# Patient Record
Sex: FEMALE | Race: WHITE | NOT HISPANIC OR LATINO | Employment: UNEMPLOYED | URBAN - METROPOLITAN AREA
[De-identification: names, ages, dates, MRNs, and addresses within clinical notes are randomized per-mention and may not be internally consistent; named-entity substitution may affect disease eponyms.]

---

## 2017-03-24 ENCOUNTER — HOSPITAL ENCOUNTER (OUTPATIENT)
Dept: LAB | Facility: CLINIC | Age: 37
Discharge: HOME | End: 2017-03-24
Attending: OBSTETRICS & GYNECOLOGY | Admitting: OBSTETRICS & GYNECOLOGY
Payer: COMMERCIAL

## 2017-03-24 DIAGNOSIS — E28.9 OVARIAN DYSFUNCTION, UNSPECIFIED: ICD-10-CM

## 2017-03-24 DIAGNOSIS — E03.9 HYPOTHYROIDISM, UNSPECIFIED: Primary | ICD-10-CM

## 2017-03-24 LAB — TSH SERPL DL<=0.05 MIU/L-ACNC: 1.7 MU/L (ref 0.4–4)

## 2017-03-24 PROCEDURE — 36415 COLL VENOUS BLD VENIPUNCTURE: CPT | Performed by: OBSTETRICS & GYNECOLOGY

## 2017-03-24 PROCEDURE — 83520 IMMUNOASSAY QUANT NOS NONAB: CPT | Performed by: OBSTETRICS & GYNECOLOGY

## 2017-03-24 PROCEDURE — 84443 ASSAY THYROID STIM HORMONE: CPT | Performed by: OBSTETRICS & GYNECOLOGY

## 2017-03-26 LAB — MIS SERPL-MCNC: 0.37 NG/ML

## 2017-04-05 ENCOUNTER — TELEPHONE (OUTPATIENT)
Dept: FAMILY MEDICINE | Facility: CLINIC | Age: 37
End: 2017-04-05

## 2017-04-05 NOTE — TELEPHONE ENCOUNTER
4/5/2017    Call Regarding Preventive Health Screening Cervical/PAP    Attempt 1    Message on voicemail     Comments:       Outreach   KV

## 2017-05-26 NOTE — TELEPHONE ENCOUNTER
5/26/2017    Call Regarding Preventive Health Screening Cervical/PAP    Attempt 2    Message on voicemail     Comments:         Outreach   DAVID

## 2017-06-15 NOTE — TELEPHONE ENCOUNTER
6/15/2017    Call Regarding Preventive Health Screening Cervical/PAP    Attempt 3    Message on voicemail     Comments:         Outreach   DAVID

## 2017-06-15 NOTE — TELEPHONE ENCOUNTER
Called patient by error, please have patient disregard today's voicemail as the annual exam is already scheduled.

## 2017-06-28 ENCOUNTER — OFFICE VISIT (OUTPATIENT)
Dept: FAMILY MEDICINE | Facility: CLINIC | Age: 37
End: 2017-06-28
Payer: COMMERCIAL

## 2017-06-28 VITALS
WEIGHT: 149 LBS | BODY MASS INDEX: 26.4 KG/M2 | RESPIRATION RATE: 16 BRPM | HEART RATE: 60 BPM | SYSTOLIC BLOOD PRESSURE: 110 MMHG | DIASTOLIC BLOOD PRESSURE: 76 MMHG | OXYGEN SATURATION: 99 % | HEIGHT: 63 IN | TEMPERATURE: 98.1 F

## 2017-06-28 DIAGNOSIS — R35.0 URINARY FREQUENCY: ICD-10-CM

## 2017-06-28 DIAGNOSIS — Z12.4 SCREENING FOR CERVICAL CANCER: ICD-10-CM

## 2017-06-28 DIAGNOSIS — Z00.00 ROUTINE GENERAL MEDICAL EXAMINATION AT A HEALTH CARE FACILITY: Primary | ICD-10-CM

## 2017-06-28 LAB
ALBUMIN UR-MCNC: NEGATIVE MG/DL
APPEARANCE UR: CLEAR
BILIRUB UR QL STRIP: NEGATIVE
COLOR UR AUTO: YELLOW
GLUCOSE UR STRIP-MCNC: NEGATIVE MG/DL
HGB UR QL STRIP: NEGATIVE
KETONES UR STRIP-MCNC: ABNORMAL MG/DL
LEUKOCYTE ESTERASE UR QL STRIP: NEGATIVE
NITRATE UR QL: NEGATIVE
PH UR STRIP: 6 PH (ref 5–7)
SP GR UR STRIP: 1.01 (ref 1–1.03)
URN SPEC COLLECT METH UR: ABNORMAL
UROBILINOGEN UR STRIP-ACNC: 0.2 EU/DL (ref 0.2–1)

## 2017-06-28 PROCEDURE — 36415 COLL VENOUS BLD VENIPUNCTURE: CPT | Performed by: PHYSICIAN ASSISTANT

## 2017-06-28 PROCEDURE — 81003 URINALYSIS AUTO W/O SCOPE: CPT | Performed by: PHYSICIAN ASSISTANT

## 2017-06-28 PROCEDURE — 99395 PREV VISIT EST AGE 18-39: CPT | Performed by: PHYSICIAN ASSISTANT

## 2017-06-28 PROCEDURE — 80061 LIPID PANEL: CPT | Performed by: PHYSICIAN ASSISTANT

## 2017-06-28 PROCEDURE — G0145 SCR C/V CYTO,THINLAYER,RESCR: HCPCS | Performed by: PHYSICIAN ASSISTANT

## 2017-06-28 PROCEDURE — G0476 HPV COMBO ASSAY CA SCREEN: HCPCS | Performed by: PHYSICIAN ASSISTANT

## 2017-06-28 PROCEDURE — 82947 ASSAY GLUCOSE BLOOD QUANT: CPT | Performed by: PHYSICIAN ASSISTANT

## 2017-06-28 NOTE — NURSING NOTE
"Chief Complaint   Patient presents with     Physical       Initial /76  Pulse 60  Temp 98.1  F (36.7  C)  Resp 16  Ht 5' 2.5\" (1.588 m)  Wt 149 lb (67.6 kg)  SpO2 99%  BMI 26.82 kg/m2 Estimated body mass index is 26.82 kg/(m^2) as calculated from the following:    Height as of this encounter: 5' 2.5\" (1.588 m).    Weight as of this encounter: 149 lb (67.6 kg).  Medication Reconciliation: mira Cuevas CMA      "

## 2017-06-28 NOTE — PROGRESS NOTES
SUBJECTIVE:   CC: Rene Gilbert is an 36 year old woman who presents for preventive health visit.     Healthy Habits:    Do you get at least three servings of calcium containing foods daily (dairy, green leafy vegetables, etc.)? Maybe 2    Amount of exercise or daily activities, outside of work: 2 day(s) per week    Problems taking medications regularly No    Medication side effects: No    Have you had an eye exam in the past two years? yes    Do you see a dentist twice per year? yes    Do you have sleep apnea, excessive snoring or daytime drowsiness?no     No sign of UA, uses pessary.        Today's PHQ-2 Score:   PHQ-2 ( 1999 Pfizer) 6/28/2017 5/29/2015   Q1: Little interest or pleasure in doing things 0 0   Q2: Feeling down, depressed or hopeless 0 0   PHQ-2 Score 0 0       Abuse: Current or Past(Physical, Sexual or Emotional)- No  Do you feel safe in your environment - Yes    Social History   Substance Use Topics     Smoking status: Never Smoker     Smokeless tobacco: Never Used     Alcohol use No     The patient does not drink >3 drinks per day nor >7 drinks per week.    Reviewed orders with patient.  Reviewed health maintenance and updated orders accordingly - Yes  BP Readings from Last 3 Encounters:   06/28/17 110/76   05/02/16 116/75   03/25/16 124/73    Wt Readings from Last 3 Encounters:   06/28/17 149 lb (67.6 kg)   05/02/16 158 lb 11.2 oz (72 kg)   03/19/16 176 lb (79.8 kg)                  Patient Active Problem List   Diagnosis     Female infertility of other specified origin     Pelvic adhesive disease     Endometriosis     Pelvic pain in female     Dyspareunia     Vitamin D deficiency     Anemia     Family history of sickle cell trait     Pelvic floor weakness in female     Vaginal and cervical prolapse     Past Surgical History:   Procedure Laterality Date     ENT SURGERY      tonsillectomy     LAPAROSCOPIC ABLATION ENDOMETRIOSIS  9/13/2013    Procedure: LAPAROSCOPIC ABLATION ENDOMETRIOSIS;;   Surgeon: Viki Mills MD;  Location: [facility]     LAPAROSCOPIC ABLATION ENDOMETRIOSIS  11/28/2014    Procedure: LAPAROSCOPIC ABLATION ENDOMETRIOSIS;  Surgeon: Viki Mills MD;  Location: [facility]     LAPAROSCOPIC CYSTECTOMY OVARIAN (BENIGN)  9/13/2013    Procedure: LAPAROSCOPIC CYSTECTOMY OVARIAN (BENIGN);;  Surgeon: Viki Mills MD;  Location: [facility]     LAPAROSCOPIC TUBAL DYE STUDY  11/28/2014    Procedure: LAPAROSCOPIC TUBAL DYE STUDY;  Surgeon: Viki Mills MD;  Location: [facility]     LAPAROSCOPY DIAGNOSTIC (GYN)  9/13/2013    Procedure: LAPAROSCOPY DIAGNOSTIC (GYN);  laparoscopy pelviscopy right laparoscopic right ovarian cyst , bilateral transection of uteral sacral ligament. cautery of endometriosis, placement of interceed;  Surgeon: Viki Mills MD;  Location: [facility]     LAPAROSCOPY DIAGNOSTIC (GYN) N/A 11/28/2014    Procedure: LAPAROSCOPY DIAGNOSTIC (GYN);  Surgeon: Viki Mills MD;  Location: [facility]       Social History   Substance Use Topics     Smoking status: Never Smoker     Smokeless tobacco: Never Used     Alcohol use No     Family History   Problem Relation Age of Onset     Hypertension Mother      Chronic Obstructive Pulmonary Disease Father      Breast Cancer Other      First cousin     DIABETES Maternal Aunt      Other - See Comments Other      Down Syndrome, Paternal first cousin           Mammogram not appropriate for this patient based on age.    Pertinent mammograms are reviewed under the imaging tab.  History of abnormal Pap smear:   NO - age 30-65 PAP every 5 years with negative HPV co-testing recommended  Last 3 Pap Results:   PAP (no units)   Date Value   10/01/2013 NIL       Reviewed and updated as needed this visit by clinical staff  Tobacco  Allergies  Meds  Problems  Med Hx  Surg Hx  Fam Hx  Soc Hx          Reviewed and updated as needed this visit by Provider  Tobacco  Allergies  Meds  Problems   "Med Hx  Surg Hx  Fam Hx  Soc Hx           ROS:  C: NEGATIVE for fever, chills, change in weight  I: NEGATIVE for worrisome rashes, moles or lesions  E: NEGATIVE for vision changes or irritation  ENT: NEGATIVE for ear, mouth and throat problems  R: NEGATIVE for significant cough or SOB  B: NEGATIVE for masses, tenderness or discharge  CV: NEGATIVE for chest pain, palpitations or peripheral edema  GI: NEGATIVE for nausea, abdominal pain, heartburn, or change in bowel habits   female: normal menses and frequency  M: NEGATIVE for significant arthralgias or myalgia  N: NEGATIVE for weakness, dizziness or paresthesias  P: NEGATIVE for changes in mood or affect    OBJECTIVE:   /76  Pulse 60  Temp 98.1  F (36.7  C)  Resp 16  Ht 5' 2.5\" (1.588 m)  Wt 149 lb (67.6 kg)  SpO2 99%  BMI 26.82 kg/m2  EXAM:  GENERAL: healthy, alert and no distress  EYES: Eyes grossly normal to inspection, PERRL and conjunctivae and sclerae normal  HENT: ear canals and TM's normal, nose and mouth without ulcers or lesions  NECK: no adenopathy, no asymmetry, masses, or scars and thyroid normal to palpation  RESP: lungs clear to auscultation - no rales, rhonchi or wheezes  BREAST: normal without masses, tenderness or nipple discharge and no palpable axillary masses or adenopathy  CV: regular rate and rhythm, normal S1 S2, no S3 or S4, no murmur, click or rub, no peripheral edema and peripheral pulses strong  ABDOMEN: soft, nontender, no hepatosplenomegaly, no masses and bowel sounds normal   (female): normal female external genitalia, normal urethral meatus, vaginal mucosa pink, moist, well rugated, and normal cervix/adnexa/uterus without masses or discharge  MS: no gross musculoskeletal defects noted, no edema  SKIN: no suspicious lesions or rashes  NEURO: Normal strength and tone, mentation intact and speech normal  PSYCH: mentation appears normal, affect normal/bright  Results for orders placed or performed in visit on 06/28/17 " "  *UA reflex to Microscopic and Culture ([place] and [facility] (except [place] and [place])   Result Value Ref Range    Color Urine Yellow     Appearance Urine Clear     Glucose Urine Negative NEG mg/dL    Bilirubin Urine Negative NEG    Ketones Urine Trace (A) NEG mg/dL    Specific Gravity Urine 1.015 1.003 - 1.035    Blood Urine Negative NEG    pH Urine 6.0 5.0 - 7.0 pH    Protein Albumin Urine Negative NEG mg/dL    Urobilinogen Urine 0.2 0.2 - 1.0 EU/dL    Nitrite Urine Negative NEG    Leukocyte Esterase Urine Negative NEG    Source Midstream Urine      ASSESSMENT/PLAN:       ICD-10-CM    1. Routine general medical examination at a health care facility Z00.00 LIPID REFLEX TO DIRECT LDL PANEL     Glucose   2. Screening for cervical cancer Z12.4 Pap imaged thin layer screen with HPV - recommended age 30 - 65     HPV High Risk Types DNA Cervical   3. Urinary frequency R35.0 *UA reflex to Microscopic and Culture ([place] and [facility] (except [place] and [place])       COUNSELING:   Reviewed preventive health counseling, as reflected in patient instructions       reports that she has never smoked. She has never used smokeless tobacco.    Estimated body mass index is 26.82 kg/(m^2) as calculated from the following:    Height as of this encounter: 5' 2.5\" (1.588 m).    Weight as of this encounter: 149 lb (67.6 kg).   Weight management plan: : -30 minutes of exercise 5 days a week (walking, jogging, housework, biking).  -Eat at least 5 servings of fruits and vegetables daily.   -Eat whole-grain bread, whole-wheat pasta and brown rice instead of white grains and rice.      Counseling Resources:  ATP IV Guidelines  Pooled Cohorts Equation Calculator  Breast Cancer Risk Calculator  FRAX Risk Assessment  ICSI Preventive Guidelines  Dietary Guidelines for Americans, 2010  USDA's MyPlate  ASA Prophylaxis  Lung CA Screening    Kenisha Taylor PA-C  [facility]  "

## 2017-06-28 NOTE — PATIENT INSTRUCTIONS

## 2017-06-28 NOTE — MR AVS SNAPSHOT
After Visit Summary   6/28/2017    Rene Gilbert    MRN: [id number]           Patient Information     Date Of Birth          1980        Visit Information        Provider Department      6/28/2017 2:30 PM Kenisha Taylor PA-C [facility]        Today's Diagnoses     Routine general medical examination at a health care facility    -  1    Screening for cervical cancer        Urinary frequency          Care Instructions      Preventive Health Recommendations  Female Ages 26 - 39  Yearly exam:   See your health care provider every year in order to    Review health changes.     Discuss preventive care.      Review your medicines if you your doctor has prescribed any.    Until age 30: Get a Pap test every three years (more often if you have had an abnormal result).    After age 30: Talk to your doctor about whether you should have a Pap test every 3 years or have a Pap test with HPV screening every 5 years.   You do not need a Pap test if your uterus was removed (hysterectomy) and you have not had cancer.  You should be tested each year for STDs (sexually transmitted diseases), if you're at risk.   Talk to your provider about how often to have your cholesterol checked.  If you are at risk for diabetes, you should have a diabetes test (fasting glucose).  Shots: Get a flu shot each year. Get a tetanus shot every 10 years.   Nutrition:     Eat at least 5 servings of fruits and vegetables each day.    Eat whole-grain bread, whole-wheat pasta and brown rice instead of white grains and rice.    Talk to your provider about Calcium and Vitamin D.     Lifestyle    Exercise at least 150 minutes a week (30 minutes a day, 5 days of the week). This will help you control your weight and prevent disease.    Limit alcohol to one drink per day.    No smoking.     Wear sunscreen to prevent skin cancer.    See your dentist every six months for an exam and  cleaning.    Preventive Health Recommendations  Female Ages 26 - 39  Yearly exam:   See your health care provider every year in order to    Review health changes.     Discuss preventive care.      Review your medicines if you your doctor has prescribed any.    Until age 30: Get a Pap test every three years (more often if you have had an abnormal result).    After age 30: Talk to your doctor about whether you should have a Pap test every 3 years or have a Pap test with HPV screening every 5 years.   You do not need a Pap test if your uterus was removed (hysterectomy) and you have not had cancer.  You should be tested each year for STDs (sexually transmitted diseases), if you're at risk.   Talk to your provider about how often to have your cholesterol checked.  If you are at risk for diabetes, you should have a diabetes test (fasting glucose).  Shots: Get a flu shot each year. Get a tetanus shot every 10 years.   Nutrition:     Eat at least 5 servings of fruits and vegetables each day.    Eat whole-grain bread, whole-wheat pasta and brown rice instead of white grains and rice.    Talk to your provider about Calcium and Vitamin D.     Lifestyle    Exercise at least 150 minutes a week (30 minutes a day, 5 days of the week). This will help you control your weight and prevent disease.    Limit alcohol to one drink per day.    No smoking.     Wear sunscreen to prevent skin cancer.    See your dentist every six months for an exam and cleaning.            Follow-ups after your visit        Who to contact     If you have questions or need follow up information about today's clinic visit or your schedule please contact [facility] directly at [phone].  Normal or non-critical lab and imaging results will be communicated to you by MyChart, letter or phone within 4 business days after the clinic has received the results. If you do not hear from us within 7 days, please contact the clinic through  "MyChart or phone. If you have a critical or abnormal lab result, we will notify you by phone as soon as possible.  Submit refill requests through [employer] or call your pharmacy and they will forward the refill request to us. Please allow 3 business days for your refill to be completed.          Additional Information About Your Visit        mymxloghart Information     [employer] gives you secure access to your electronic health record. If you see a primary care provider, you can also send messages to your care team and make appointments. If you have questions, please call your primary care clinic.  If you do not have a primary care provider, please call [phone] and they will assist you.        Care EveryWhere ID     This is your Care EveryWhere ID. This could be used by other organizations to access your [facility] medical records  [id number]        Your Vitals Were     Pulse Temperature Respirations Height Pulse Oximetry BMI (Body Mass Index)    60 98.1  F (36.7  C) 16 5' 2.5\" (1.588 m) 99% 26.82 kg/m2       Blood Pressure from Last 3 Encounters:   06/28/17 110/76   05/02/16 116/75   03/25/16 124/73    Weight from Last 3 Encounters:   06/28/17 149 lb (67.6 kg)   05/02/16 158 lb 11.2 oz (72 kg)   03/19/16 176 lb (79.8 kg)              We Performed the Following     *UA reflex to Microscopic and Culture ([place] and [facility] (except [place] and [place])     HPV High Risk Types DNA Cervical     Pap imaged thin layer screen with HPV - recommended age 30 - 65        Primary Care Provider Office Phone # Fax #    Malou Rosales [phone]       [facility] [address] [address]        Equal Access to Services     CHAR MARTINEZ : Hadii bisi Diehl, fareed wakefield, maurilio baptistealmaanatoly hairston. So [facility] [phone].    ATENCIÓN: Si habla español, tiene a lynch disposición servicios gratuitos de asistencia lingüística. " Joseph roblero [phone].    We comply with applicable federal civil rights laws and [place] laws. We do not discriminate on the basis of race, color, national origin, age, disability sex, sexual orientation or gender identity.            Thank you!     Thank you for choosing [facility]  for your care. Our goal is always to provide you with excellent care. Hearing back from our patients is one way we can continue to improve our services. Please take a few minutes to complete the written survey that you may receive in the mail after your visit with us. Thank you!             Your Updated Medication List - Protect others around you: Learn how to safely use, store and throw away your medicines at www.disposemymeds.org.          This list is accurate as of: 6/28/17  3:01 PM.  Always use your most recent med list.                   Brand Name Dispense Instructions for use Diagnosis    CENTRUM SILVER per tablet      Take 1 tablet by mouth daily        COENZYME Q-10 PO      Take 30 mg by mouth daily        dhea 25 MG Tabs      Take 25 mg by mouth daily        prenatal multivitamin  plus iron 27-0.8 MG Tabs per tablet      Take 1 tablet by mouth daily        vitamin B complex with vitamin C Tabs tablet      Take 1 tablet by mouth daily        VITAMIN D (CHOLECALCIFEROL) PO      Take by mouth daily

## 2017-06-28 NOTE — PROGRESS NOTES
"   SUBJECTIVE:   CC: Rene Gilbert is an 36 year old woman who presents for preventive health visit.     Healthy Habits:    Do you get at least three servings of calcium containing foods daily (dairy, green leafy vegetables, etc.)? Maybe 2    Amount of exercise or daily activities, outside of work: 2 day(s) per week    Problems taking medications regularly No    Medication side effects: No    Have you had an eye exam in the past two years? yes    Do you see a dentist twice per year? yes    Do you have sleep apnea, excessive snoring or daytime drowsiness?no        {additional problems to add (Optional):632479}    Today's PHQ-2 Score:   PHQ-2 ( 1999 Pfizer) 6/28/2017 5/29/2015   Q1: Little interest or pleasure in doing things 0 0   Q2: Feeling down, depressed or hopeless 0 0   PHQ-2 Score 0 0       Abuse: Current or Past(Physical, Sexual or Emotional)- No  Do you feel safe in your environment - Yes    Social History   Substance Use Topics     Smoking status: Never Smoker     Smokeless tobacco: Never Used     Alcohol use No     The patient does not drink >3 drinks per day nor >7 drinks per week.    Reviewed orders with patient.  Reviewed health maintenance and updated orders accordingly - Yes  {Chronicprobdata (Optional):794288}    {Mammo Decision Support (Optional):955941}    Pertinent mammograms are reviewed under the imaging tab.  History of abnormal Pap smear: {PAP HX:343032}    Reviewed and updated as needed this visit by clinical staff  Tobacco  Allergies  Problems  Med Hx  Surg Hx  Fam Hx  Soc Hx        Reviewed and updated as needed this visit by Provider  Problems        {HISTORY OPTIONS (Optional):433559}    ROS:  {FEMALE PREVENTATIVE ROS:862290}    OBJECTIVE:   /76  Pulse 60  Temp 98.1  F (36.7  C)  Resp 16  Ht 5' 2.5\" (1.588 m)  Wt 149 lb (67.6 kg)  SpO2 99%  BMI 26.82 kg/m2  EXAM:  {Exam Choices:566410}    ASSESSMENT/PLAN:   {Diag Picklist:048897}    COUNSELING:   {FEMALE COUNSELING " "MESSAGES:606772::\"Reviewed preventive health counseling, as reflected in patient instructions\"}    {BP Counseling- Complete if BP >= 120/80  (Optional):179723}     reports that she has never smoked. She has never used smokeless tobacco.  {Tobacco Cessation -- Complete if patient is a smoker (Optional):369399}  Estimated body mass index is 26.82 kg/(m^2) as calculated from the following:    Height as of this encounter: 5' 2.5\" (1.588 m).    Weight as of this encounter: 149 lb (67.6 kg).   {Weight Management Plan (ACO) Complete if BMI is abnormal-  Ages 18-64  BMI >24.9.  Age 65+ with BMI <23 or >30 (Optional):001490}    Counseling Resources:  ATP IV Guidelines  Pooled Cohorts Equation Calculator  Breast Cancer Risk Calculator  FRAX Risk Assessment  ICSI Preventive Guidelines  Dietary Guidelines for Americans, 2010  USDA's MyPlate  ASA Prophylaxis  Lung CA Screening    Kenisha Taylor PA-C  [facility]  "

## 2017-06-28 NOTE — LETTER
[facility]  [address]  [address]  [place] [id number] [phone]                                                                                                           Rene Gilbert  [address]  [place] [address]    June 29, 2017      Dear Rene,    The results of your recent tests were reviewed and are enclosed.     - Your total cholesterol is a little high (>200) but okay for not fasting.  We can recheck next year.  - Your glucose (screening for diabetes) was normal.  -Urine is normal.  - You will receive your Pap Smear results in a separate letter.    Results for orders placed or performed in visit on 06/28/17   LIPID REFLEX TO DIRECT LDL PANEL   Result Value Ref Range    Cholesterol 228 (H) <200 mg/dL    Triglycerides 69 <150 mg/dL    HDL Cholesterol 57 >49 mg/dL    LDL Cholesterol Calculated 157 (H) <100 mg/dL    Non HDL Cholesterol 171 (H) <130 mg/dL   Glucose   Result Value Ref Range    Glucose 83 70 - 99 mg/dL   *UA reflex to Microscopic and Culture ([place] and [facility] [place] [facility] (except [place] and [place])   Result Value Ref Range    Color Urine Yellow     Appearance Urine Clear     Glucose Urine Negative NEG mg/dL    Bilirubin Urine Negative NEG    Ketones Urine Trace (A) NEG mg/dL    Specific Gravity Urine 1.015 1.003 - 1.035    Blood Urine Negative NEG    pH Urine 6.0 5.0 - 7.0 pH    Protein Albumin Urine Negative NEG mg/dL    Urobilinogen Urine 0.2 0.2 - 1.0 EU/dL    Nitrite Urine Negative NEG    Leukocyte Esterase Urine Negative NEG    Source Midstream Urine      Thank you for choosing [facility].  We appreciate the opportunity to serve you and look forward to supporting your healthcare needs in the future.    If you have any questions or concerns, please call me or my staff at [phone].      Sincerely,      Kenisha Taylor PA-C

## 2017-06-29 PROBLEM — E78.2 MIXED HYPERLIPIDEMIA: Status: ACTIVE | Noted: 2017-06-29

## 2017-06-29 LAB
CHOLEST SERPL-MCNC: 228 MG/DL
GLUCOSE SERPL-MCNC: 83 MG/DL (ref 70–99)
HDLC SERPL-MCNC: 57 MG/DL
LDLC SERPL CALC-MCNC: 157 MG/DL
NONHDLC SERPL-MCNC: 171 MG/DL
TRIGL SERPL-MCNC: 69 MG/DL

## 2017-07-05 LAB
COPATH REPORT: NORMAL
PAP: NORMAL

## 2017-07-06 LAB
FINAL DIAGNOSIS: NORMAL
HPV HR 12 DNA CVX QL NAA+PROBE: NEGATIVE
HPV16 DNA SPEC QL NAA+PROBE: NEGATIVE
HPV18 DNA SPEC QL NAA+PROBE: NEGATIVE
SPECIMEN DESCRIPTION: NORMAL

## 2018-05-24 ENCOUNTER — MEDICAL CORRESPONDENCE (OUTPATIENT)
Dept: HEALTH INFORMATION MANAGEMENT | Facility: CLINIC | Age: 38
End: 2018-05-24

## 2018-06-27 ENCOUNTER — HOSPITAL ENCOUNTER (OUTPATIENT)
Dept: LAB | Facility: CLINIC | Age: 38
Discharge: HOME | End: 2018-06-27
Admitting: OBSTETRICS & GYNECOLOGY
Payer: COMMERCIAL

## 2018-06-27 DIAGNOSIS — E28.2 POLYCYSTIC OVARIES: ICD-10-CM

## 2018-06-27 DIAGNOSIS — Z13.1 SCREENING FOR DIABETES MELLITUS: ICD-10-CM

## 2018-06-27 DIAGNOSIS — N92.5 RETROGRADE MENSTRUATION: ICD-10-CM

## 2018-06-27 DIAGNOSIS — Z13.6 SCREENING FOR ISCHEMIC HEART DISEASE: ICD-10-CM

## 2018-06-27 DIAGNOSIS — E28.9 DISORDER OF ENDOCRINE OVARY: ICD-10-CM

## 2018-06-27 DIAGNOSIS — Z11.3 SCREENING EXAMINATION FOR VENEREAL DISEASE: ICD-10-CM

## 2018-06-27 DIAGNOSIS — E03.9 MYXEDEMA HEART DISEASE: ICD-10-CM

## 2018-06-27 DIAGNOSIS — I51.9 MYXEDEMA HEART DISEASE: ICD-10-CM

## 2018-06-27 LAB
ABO + RH BLD: NORMAL
ABO + RH BLD: NORMAL
ANION GAP SERPL CALCULATED.3IONS-SCNC: 6 MMOL/L (ref 3–14)
B-HCG SERPL-ACNC: <1 IU/L (ref 0–5)
BLD GP AB SCN SERPL QL: NORMAL
BLOOD BANK CMNT PATIENT-IMP: NORMAL
BUN SERPL-MCNC: 10 MG/DL (ref 7–30)
CALCIUM SERPL-MCNC: 8.5 MG/DL (ref 8.5–10.1)
CHLORIDE SERPL-SCNC: 106 MMOL/L (ref 94–109)
CO2 SERPL-SCNC: 27 MMOL/L (ref 20–32)
CREAT SERPL-MCNC: 0.6 MG/DL (ref 0.52–1.04)
ERYTHROCYTE [DISTWIDTH] IN BLOOD BY AUTOMATED COUNT: 12.3 % (ref 10–15)
ESTRADIOL SERPL-MCNC: 112 PG/ML
FSH SERPL-ACNC: 6 IU/L
GFR SERPL CREATININE-BSD FRML MDRD: >90 ML/MIN/1.7M2
GLUCOSE SERPL-MCNC: 88 MG/DL (ref 70–99)
HBA1C MFR BLD: 4.9 % (ref 0–5.6)
HCT VFR BLD AUTO: 38.2 % (ref 35–47)
HGB BLD-MCNC: 12.3 G/DL (ref 11.7–15.7)
LH SERPL-ACNC: 3.7 IU/L
MCH RBC QN AUTO: 30.4 PG (ref 26.5–33)
MCHC RBC AUTO-ENTMCNC: 32.2 G/DL (ref 31.5–36.5)
MCV RBC AUTO: 94 FL (ref 78–100)
PLATELET # BLD AUTO: 389 10E9/L (ref 150–450)
POTASSIUM SERPL-SCNC: 3.7 MMOL/L (ref 3.4–5.3)
PROGEST SERPL-MCNC: <0.2 NG/ML
PROLACTIN SERPL-MCNC: 8 UG/L (ref 3–27)
RBC # BLD AUTO: 4.05 10E12/L (ref 3.8–5.2)
SODIUM SERPL-SCNC: 139 MMOL/L (ref 133–144)
SPECIMEN EXP DATE BLD: NORMAL
TSH SERPL DL<=0.005 MIU/L-ACNC: 0.52 MU/L (ref 0.4–4)
WBC # BLD AUTO: 7.1 10E9/L (ref 4–11)

## 2018-06-27 PROCEDURE — 86780 TREPONEMA PALLIDUM: CPT | Performed by: OBSTETRICS & GYNECOLOGY

## 2018-06-27 PROCEDURE — 84144 ASSAY OF PROGESTERONE: CPT | Performed by: OBSTETRICS & GYNECOLOGY

## 2018-06-27 PROCEDURE — 84702 CHORIONIC GONADOTROPIN TEST: CPT | Performed by: OBSTETRICS & GYNECOLOGY

## 2018-06-27 PROCEDURE — 86901 BLOOD TYPING SEROLOGIC RH(D): CPT | Performed by: OBSTETRICS & GYNECOLOGY

## 2018-06-27 PROCEDURE — 87389 HIV-1 AG W/HIV-1&-2 AB AG IA: CPT | Performed by: OBSTETRICS & GYNECOLOGY

## 2018-06-27 PROCEDURE — 85027 COMPLETE CBC AUTOMATED: CPT | Performed by: OBSTETRICS & GYNECOLOGY

## 2018-06-27 PROCEDURE — 86762 RUBELLA ANTIBODY: CPT | Performed by: OBSTETRICS & GYNECOLOGY

## 2018-06-27 PROCEDURE — 86900 BLOOD TYPING SEROLOGIC ABO: CPT | Performed by: OBSTETRICS & GYNECOLOGY

## 2018-06-27 PROCEDURE — 82306 VITAMIN D 25 HYDROXY: CPT | Performed by: OBSTETRICS & GYNECOLOGY

## 2018-06-27 PROCEDURE — 82670 ASSAY OF TOTAL ESTRADIOL: CPT | Performed by: OBSTETRICS & GYNECOLOGY

## 2018-06-27 PROCEDURE — 83002 ASSAY OF GONADOTROPIN (LH): CPT | Performed by: OBSTETRICS & GYNECOLOGY

## 2018-06-27 PROCEDURE — 83001 ASSAY OF GONADOTROPIN (FSH): CPT | Performed by: OBSTETRICS & GYNECOLOGY

## 2018-06-27 PROCEDURE — 36415 COLL VENOUS BLD VENIPUNCTURE: CPT | Performed by: OBSTETRICS & GYNECOLOGY

## 2018-06-27 PROCEDURE — 87340 HEPATITIS B SURFACE AG IA: CPT | Performed by: OBSTETRICS & GYNECOLOGY

## 2018-06-27 PROCEDURE — 86850 RBC ANTIBODY SCREEN: CPT | Performed by: OBSTETRICS & GYNECOLOGY

## 2018-06-27 PROCEDURE — 84443 ASSAY THYROID STIM HORMONE: CPT | Performed by: OBSTETRICS & GYNECOLOGY

## 2018-06-27 PROCEDURE — 84146 ASSAY OF PROLACTIN: CPT | Performed by: OBSTETRICS & GYNECOLOGY

## 2018-06-27 PROCEDURE — 83036 HEMOGLOBIN GLYCOSYLATED A1C: CPT | Performed by: OBSTETRICS & GYNECOLOGY

## 2018-06-27 PROCEDURE — 80048 BASIC METABOLIC PNL TOTAL CA: CPT | Performed by: OBSTETRICS & GYNECOLOGY

## 2018-06-27 PROCEDURE — 83090 ASSAY OF HOMOCYSTEINE: CPT | Performed by: OBSTETRICS & GYNECOLOGY

## 2018-06-27 PROCEDURE — 86803 HEPATITIS C AB TEST: CPT | Performed by: OBSTETRICS & GYNECOLOGY

## 2018-06-28 LAB
DEPRECATED CALCIDIOL+CALCIFEROL SERPL-MC: 23 UG/L (ref 20–75)
HBV SURFACE AG SERPL QL IA: NONREACTIVE
HCV AB SERPL QL IA: NONREACTIVE
HIV 1+2 AB+HIV1 P24 AG SERPL QL IA: NONREACTIVE
RUBV IGG SERPL IA-ACNC: 90 IU/ML
T PALLIDUM AB SER QL: NONREACTIVE

## 2018-07-03 LAB — HCYS SERPL-SCNC: 6.3 UMOL/L (ref 4–12)

## 2018-09-12 ENCOUNTER — OFFICE VISIT (OUTPATIENT)
Dept: FAMILY MEDICINE | Facility: CLINIC | Age: 38
End: 2018-09-12
Payer: COMMERCIAL

## 2018-09-12 VITALS
HEIGHT: 63 IN | HEART RATE: 82 BPM | DIASTOLIC BLOOD PRESSURE: 72 MMHG | OXYGEN SATURATION: 96 % | RESPIRATION RATE: 16 BRPM | BODY MASS INDEX: 24.98 KG/M2 | WEIGHT: 141 LBS | TEMPERATURE: 97.9 F | SYSTOLIC BLOOD PRESSURE: 116 MMHG

## 2018-09-12 DIAGNOSIS — J32.9 SINOBRONCHITIS: Primary | ICD-10-CM

## 2018-09-12 DIAGNOSIS — Z23 NEED FOR VACCINATION: ICD-10-CM

## 2018-09-12 DIAGNOSIS — N92.6 MISSED PERIODS: ICD-10-CM

## 2018-09-12 DIAGNOSIS — J40 SINOBRONCHITIS: Primary | ICD-10-CM

## 2018-09-12 LAB — BETA HCG QUAL IFA URINE: NEGATIVE

## 2018-09-12 PROCEDURE — 90686 IIV4 VACC NO PRSV 0.5 ML IM: CPT | Performed by: FAMILY MEDICINE

## 2018-09-12 PROCEDURE — 99213 OFFICE O/P EST LOW 20 MIN: CPT | Mod: 25 | Performed by: FAMILY MEDICINE

## 2018-09-12 PROCEDURE — 90471 IMMUNIZATION ADMIN: CPT | Performed by: FAMILY MEDICINE

## 2018-09-12 PROCEDURE — 84703 CHORIONIC GONADOTROPIN ASSAY: CPT | Performed by: FAMILY MEDICINE

## 2018-09-12 RX ORDER — CODEINE PHOSPHATE AND GUAIFENESIN 10; 100 MG/5ML; MG/5ML
2 SOLUTION ORAL EVERY 6 HOURS PRN
Qty: 120 ML | Refills: 0 | Status: SHIPPED | OUTPATIENT
Start: 2018-09-12

## 2018-09-12 RX ORDER — AMOXICILLIN 875 MG
875 TABLET ORAL 2 TIMES DAILY
Qty: 20 TABLET | Refills: 0 | Status: SHIPPED | OUTPATIENT
Start: 2018-09-12 | End: 2022-11-02

## 2018-09-12 NOTE — PROGRESS NOTES
Jose G bejarano    Pleasure meeting you in clinic today    Urine pregnancy test is negative    Please keep us posted with questions or concerns .      Best Regards,    Ifrah Palma MD  [facility]  [phone]

## 2018-09-12 NOTE — PROGRESS NOTES
SUBJECTIVE:   Rene Gilbert is a 37 year old female who presents to clinic today for the following health issues:      Acute Illness   Acute illness concerns: cold  Onset: 2 weeks     Fever: YES    Chills/Sweats: YES    Headache (location?): YES    Sinus Pressure:no    Conjunctivitis:  no    Ear Pain: YES- both    Rhinorrhea: no    Congestion: YES    Sore Throat: YES     Cough: YES-productive of yellow sputum, productive of green sputum    Wheeze: no    Decreased Appetite: YES    Nausea: no    Vomiting: no    Diarrhea:  YES    Dysuria/Freq.: no    Fatigue/Achiness: YES    Sick/Strep Exposure: no     Therapies Tried and outcome: musinex       PROBLEMS TO ADD ON...missed perods    Problem list and histories reviewed & adjusted, as indicated.  Additional history: as documented    Patient Active Problem List   Diagnosis     Female infertility of other specified origin     Pelvic adhesive disease     Endometriosis     Pelvic pain in female     Dyspareunia     Vitamin D deficiency     Anemia     Family history of sickle cell trait     Pelvic floor weakness in female     Vaginal and cervical prolapse     Mixed hyperlipidemia     Past Surgical History:   Procedure Laterality Date     ENT SURGERY      tonsillectomy     LAPAROSCOPIC ABLATION ENDOMETRIOSIS  9/13/2013    Procedure: LAPAROSCOPIC ABLATION ENDOMETRIOSIS;;  Surgeon: Viki Mills MD;  Location: [facility]     LAPAROSCOPIC ABLATION ENDOMETRIOSIS  11/28/2014    Procedure: LAPAROSCOPIC ABLATION ENDOMETRIOSIS;  Surgeon: Viki Mills MD;  Location: [facility]     LAPAROSCOPIC CYSTECTOMY OVARIAN (BENIGN)  9/13/2013    Procedure: LAPAROSCOPIC CYSTECTOMY OVARIAN (BENIGN);;  Surgeon: Viki Mills MD;  Location: [facility]     LAPAROSCOPIC TUBAL DYE STUDY  11/28/2014    Procedure: LAPAROSCOPIC TUBAL DYE STUDY;  Surgeon: Viki Mills MD;  Location: [facility]     LAPAROSCOPY DIAGNOSTIC (GYN)  9/13/2013    Procedure: LAPAROSCOPY  "DIAGNOSTIC (GYN);  laparoscopy pelviscopy right laparoscopic right ovarian cyst , bilateral transection of uteral sacral ligament. cautery of endometriosis, placement of interceed;  Surgeon: Viki Mills MD;  Location: [facility]     LAPAROSCOPY DIAGNOSTIC (GYN) N/A 11/28/2014    Procedure: LAPAROSCOPY DIAGNOSTIC (GYN);  Surgeon: Viki Mills MD;  Location: [facility]       Social History   Substance Use Topics     Smoking status: Never Smoker     Smokeless tobacco: Never Used     Alcohol use No     Family History   Problem Relation Age of Onset     Hypertension Mother      Chronic Obstructive Pulmonary Disease Father      Breast Cancer Other      First cousin     Diabetes Maternal Aunt      Other - See Comments Other      Down Syndrome, Paternal first cousin           Reviewed and updated as needed this visit by clinical staff  Tobacco  Allergies  Meds       Reviewed and updated as needed this visit by Provider         ROS:  Constitutional, HEENT, cardiovascular, pulmonary, gi and gu systems are negative, except as otherwise noted.    OBJECTIVE:     /72  Pulse 82  Temp 97.9  F (36.6  C) (Oral)  Resp 16  Ht 5' 2.5\" (1.588 m)  Wt 141 lb (64 kg)  SpO2 96%  BMI 25.38 kg/m2  Body mass index is 25.38 kg/(m^2).  GENERAL: healthy, alert and no distress  HENT: ear canals and TM's normal, nose and mouth without ulcers or lesions  NECK: no adenopathy, no asymmetry, masses, or scars and thyroid normal to palpation  RESP: lungs clear to auscultation - no rales, rhonchi or wheezes  CV: regular rate and rhythm, normal S1 S2, no S3 or S4, no murmur, click or rub, no peripheral edema and peripheral pulses strong  ABDOMEN: soft, nontender, no hepatosplenomegaly, no masses and bowel sounds normal  MS: no gross musculoskeletal defects noted, no edema    Diagnostic Test Results:    ASSESSMENT/PLAN:   1. Sinobronchitis  Plan:given the duration > 10 days, Start antibiotic twice daily for 10 days  Cough " syrup as needed  Bedtime   Follow up in 2 weeks if not better  Follow up earlier if any worse    - amoxicillin (AMOXIL) 875 MG tablet; Take 1 tablet (875 mg) by mouth 2 times daily  Dispense: 20 tablet; Refill: 0  - guaiFENesin-codeine (ROBITUSSIN AC) 100-10 MG/5ML SOLN solution; Take 10 mLs by mouth every 6 hours as needed for cough  Dispense: 120 mL; Refill: 0    2. Missed periods  Plan:- Beta HCG Qual, Urine -negative     3. Need for vaccination  - FLU VAC PRESRV FREE QUAD SPLIT VIR, IM (3+ YRS)  - VACCINE ADMINISTRATION, INITIAL    Potential medication side effects were discussed with the patient; let me know if any occur.  37 year old      Ifrah Palma MD  [facility]

## 2018-09-12 NOTE — NURSING NOTE
"Chief Complaint   Patient presents with     URI     /72  Pulse 82  Temp 97.9  F (36.6  C) (Oral)  Resp 16  Ht 5' 2.5\" (1.588 m)  Wt 141 lb (64 kg)  SpO2 96%  BMI 25.38 kg/m2 Estimated body mass index is 25.38 kg/(m^2) as calculated from the following:    Height as of this encounter: 5' 2.5\" (1.588 m).    Weight as of this encounter: 141 lb (64 kg).  bp completed using cuff size: regular       Health Maintenance addressed:  NONE    n/a    Mery Caputo MA     "

## 2018-09-12 NOTE — MR AVS SNAPSHOT
After Visit Summary   9/12/2018    Rene Gilbert    MRN: [id number]           Patient Information     Date Of Birth          1980        Visit Information        Provider Department      9/12/2018 2:20 PM Ifrah Palma MD [facility]        Today's Diagnoses     Sinobronchitis    -  1    Missed periods        Need for vaccination          Care Instructions    Start antibiotic twice daily for 10 days  Cough syrup as needed  Bedtime   Follow up in 2 weeks if not better  Follow up earlier if any worse            Follow-ups after your visit        Follow-up notes from your care team     Return in about 2 weeks (around 9/26/2018) for as needed, cough.      Who to contact     If you have questions or need follow up information about today's clinic visit or your schedule please contact [facility] directly at [phone].  Normal or non-critical lab and imaging results will be communicated to you by Slidelyhart, letter or phone within 4 business days after the clinic has received the results. If you do not hear from us within 7 days, please contact the clinic through Slidelyhart or phone. If you have a critical or abnormal lab result, we will notify you by phone as soon as possible.  Submit refill requests through Aquto or call your pharmacy and they will forward the refill request to us. Please allow 3 business days for your refill to be completed.          Additional Information About Your Visit        MyChart Information     Aquto gives you secure access to your electronic health record. If you see a primary care provider, you can also send messages to your care team and make appointments. If you have questions, please call your primary care clinic.  If you do not have a primary care provider, please call [phone] and they will assist you.        Care EveryWhere ID     This is your Care EveryWhere ID. This could be used by other organizations to access your [place]  "medical records  [id number]        Your Vitals Were     Pulse Temperature Respirations Height Pulse Oximetry BMI (Body Mass Index)    82 97.9  F (36.6  C) (Oral) 16 5' 2.5\" (1.588 m) 96% 25.38 kg/m2       Blood Pressure from Last 3 Encounters:   09/12/18 116/72   06/28/17 110/76   05/02/16 116/75    Weight from Last 3 Encounters:   09/12/18 141 lb (64 kg)   06/28/17 149 lb (67.6 kg)   05/02/16 158 lb 11.2 oz (72 kg)              We Performed the Following     Beta HCG Qual, Urine - [facility] and [place] ([id number])     FLU VAC PRESRV FREE QUAD SPLIT VIR, IM (3+ YRS)     VACCINE ADMINISTRATION, INITIAL          Today's Medication Changes          These changes are accurate as of 9/12/18 11:59 PM.  If you have any questions, ask your nurse or doctor.               Start taking these medicines.        Dose/Directions    amoxicillin 875 MG tablet   Commonly known as:  AMOXIL   Used for:  Sinobronchitis   Started by:  Ifrah Palma MD        Dose:  875 mg   Take 1 tablet (875 mg) by mouth 2 times daily   Quantity:  20 tablet   Refills:  0       guaiFENesin-codeine 100-10 MG/5ML Soln solution   Commonly known as:  ROBITUSSIN AC   Used for:  Sinobronchitis   Started by:  Ifrah Palma MD        Dose:  2 tsp.   Take 10 mLs by mouth every 6 hours as needed for cough   Quantity:  120 mL   Refills:  0            Where to get your medicines      These medications were sent to [id number] - [place] - [address] AT [facility] [address]  [address]  [address] [facility], [place] [address]     Phone:  [phone]     amoxicillin 875 MG tablet         Some of these will need a paper prescription and others can be bought over the counter.  Ask your nurse if you have questions.     Bring a paper prescription for each of these medications     guaiFENesin-codeine 100-10 MG/5ML Soln solution                Primary Care Provider Office Phone # Fax #    Malou Rosales MD " [phone]       [address] [facility] [address]        Equal Access to Services     JEROME MARTINEZ : Hadii aad ku hadrosariotucker Michelledanna, wanolanda rohanrajeevha, sonalita kanadirada everardomatthieujasmyn, anatoly faithin hayaajayden mcgeelalit bladenoe leigh allan. So [facility] [phone].    ATENCIÓN: Si habla español, tiene a lynch disposición servicios gratuitos de asistencia lingüística. Joseph al [phone].    We comply with applicable federal civil rights laws and [place] laws. We do not discriminate on the basis of race, color, national origin, age, disability, sex, sexual orientation, or gender identity.            Thank you!     Thank you for choosing [facility]  for your care. Our goal is always to provide you with excellent care. Hearing back from our patients is one way we can continue to improve our services. Please take a few minutes to complete the written survey that you may receive in the mail after your visit with us. Thank you!             Your Updated Medication List - Protect others around you: Learn how to safely use, store and throw away your medicines at www.disposemymeds.org.          This list is accurate as of 9/12/18 11:59 PM.  Always use your most recent med list.                   Brand Name Dispense Instructions for use Diagnosis    amoxicillin 875 MG tablet    AMOXIL    20 tablet    Take 1 tablet (875 mg) by mouth 2 times daily    Sinobronchitis       CENTRUM SILVER per tablet      Take 1 tablet by mouth daily        COENZYME Q-10 PO      Take 30 mg by mouth daily        dhea 25 MG Tabs      Take 25 mg by mouth daily        guaiFENesin-codeine 100-10 MG/5ML Soln solution    ROBITUSSIN AC    120 mL    Take 10 mLs by mouth every 6 hours as needed for cough    Sinobronchitis       prenatal multivitamin plus iron 27-0.8 MG Tabs per tablet      Take 1 tablet by mouth daily        vitamin B complex with vitamin C Tabs tablet      Take 1 tablet by mouth daily        VITAMIN D (CHOLECALCIFEROL) PO      Take by  mouth daily

## 2018-09-12 NOTE — PATIENT INSTRUCTIONS
Start antibiotic twice daily for 10 days  Cough syrup as needed  Bedtime   Follow up in 2 weeks if not better  Follow up earlier if any worse

## 2018-11-17 ENCOUNTER — HOSPITAL ENCOUNTER (OUTPATIENT)
Dept: LAB | Facility: CLINIC | Age: 38
Discharge: HOME | End: 2018-11-17
Attending: OBSTETRICS & GYNECOLOGY | Admitting: OBSTETRICS & GYNECOLOGY
Payer: COMMERCIAL

## 2018-11-17 LAB
ABO + RH BLD: NORMAL
ABO + RH BLD: NORMAL
ERYTHROCYTE [DISTWIDTH] IN BLOOD BY AUTOMATED COUNT: 12.3 % (ref 10–15)
HCT VFR BLD AUTO: 39.1 % (ref 35–47)
HGB BLD-MCNC: 12.6 G/DL (ref 11.7–15.7)
MCH RBC QN AUTO: 30.8 PG (ref 26.5–33)
MCHC RBC AUTO-ENTMCNC: 32.2 G/DL (ref 31.5–36.5)
MCV RBC AUTO: 96 FL (ref 78–100)
PLATELET # BLD AUTO: 434 10E9/L (ref 150–450)
RBC # BLD AUTO: 4.09 10E12/L (ref 3.8–5.2)
SPECIMEN EXP DATE BLD: NORMAL
WBC # BLD AUTO: 8.2 10E9/L (ref 4–11)

## 2018-11-17 PROCEDURE — 86803 HEPATITIS C AB TEST: CPT | Performed by: OBSTETRICS & GYNECOLOGY

## 2018-11-17 PROCEDURE — 86762 RUBELLA ANTIBODY: CPT | Performed by: OBSTETRICS & GYNECOLOGY

## 2018-11-17 PROCEDURE — 87340 HEPATITIS B SURFACE AG IA: CPT | Performed by: OBSTETRICS & GYNECOLOGY

## 2018-11-17 PROCEDURE — 87389 HIV-1 AG W/HIV-1&-2 AB AG IA: CPT | Performed by: OBSTETRICS & GYNECOLOGY

## 2018-11-17 PROCEDURE — 36415 COLL VENOUS BLD VENIPUNCTURE: CPT | Performed by: OBSTETRICS & GYNECOLOGY

## 2018-11-17 PROCEDURE — 86901 BLOOD TYPING SEROLOGIC RH(D): CPT | Performed by: OBSTETRICS & GYNECOLOGY

## 2018-11-17 PROCEDURE — 86900 BLOOD TYPING SEROLOGIC ABO: CPT | Performed by: OBSTETRICS & GYNECOLOGY

## 2018-11-17 PROCEDURE — 85027 COMPLETE CBC AUTOMATED: CPT | Performed by: OBSTETRICS & GYNECOLOGY

## 2018-11-17 PROCEDURE — 86780 TREPONEMA PALLIDUM: CPT | Performed by: OBSTETRICS & GYNECOLOGY

## 2018-11-18 LAB
RUBV IGG SERPL IA-ACNC: 90 IU/ML
T PALLIDUM AB SER QL: NONREACTIVE

## 2018-11-19 LAB
HBV SURFACE AG SERPL QL IA: NONREACTIVE
HCV AB SERPL QL IA: NONREACTIVE
HIV 1+2 AB+HIV1 P24 AG SERPL QL IA: NONREACTIVE

## 2019-01-08 ENCOUNTER — HOSPITAL ENCOUNTER (OUTPATIENT)
Dept: LAB | Facility: CLINIC | Age: 39
Discharge: HOME | End: 2019-01-08
Attending: OBSTETRICS & GYNECOLOGY | Admitting: OBSTETRICS & GYNECOLOGY
Payer: COMMERCIAL

## 2019-01-08 LAB
ESTRADIOL SERPL-MCNC: 14 PG/ML
FSH SERPL-ACNC: 24.4 IU/L
LH SERPL-ACNC: 8.7 IU/L
PROGEST SERPL-MCNC: 0.3 NG/ML

## 2019-01-08 PROCEDURE — 83001 ASSAY OF GONADOTROPIN (FSH): CPT | Performed by: OBSTETRICS & GYNECOLOGY

## 2019-01-08 PROCEDURE — 36415 COLL VENOUS BLD VENIPUNCTURE: CPT | Performed by: OBSTETRICS & GYNECOLOGY

## 2019-01-08 PROCEDURE — 83002 ASSAY OF GONADOTROPIN (LH): CPT | Performed by: OBSTETRICS & GYNECOLOGY

## 2019-01-08 PROCEDURE — 84144 ASSAY OF PROGESTERONE: CPT | Performed by: OBSTETRICS & GYNECOLOGY

## 2019-01-08 PROCEDURE — 82670 ASSAY OF TOTAL ESTRADIOL: CPT | Performed by: OBSTETRICS & GYNECOLOGY

## 2019-01-14 ENCOUNTER — HOSPITAL ENCOUNTER (OUTPATIENT)
Dept: LAB | Facility: CLINIC | Age: 39
Discharge: HOME | End: 2019-01-14
Attending: OBSTETRICS & GYNECOLOGY | Admitting: OBSTETRICS & GYNECOLOGY
Payer: COMMERCIAL

## 2019-01-14 DIAGNOSIS — N83.00 FOLLICULAR CYST OF OVARY: ICD-10-CM

## 2019-01-14 DIAGNOSIS — E28.2 POLYCYSTIC OVARIES: ICD-10-CM

## 2019-01-14 LAB
ESTRADIOL SERPL-MCNC: 132 PG/ML
FSH SERPL-ACNC: 10.3 IU/L
LH SERPL-ACNC: 4.6 IU/L
PROGEST SERPL-MCNC: 0.5 NG/ML

## 2019-01-14 PROCEDURE — 82670 ASSAY OF TOTAL ESTRADIOL: CPT | Performed by: OBSTETRICS & GYNECOLOGY

## 2019-01-14 PROCEDURE — 83002 ASSAY OF GONADOTROPIN (LH): CPT | Performed by: OBSTETRICS & GYNECOLOGY

## 2019-01-14 PROCEDURE — 83001 ASSAY OF GONADOTROPIN (FSH): CPT | Performed by: OBSTETRICS & GYNECOLOGY

## 2019-01-14 PROCEDURE — 84144 ASSAY OF PROGESTERONE: CPT | Performed by: OBSTETRICS & GYNECOLOGY

## 2019-01-17 ENCOUNTER — HOSPITAL ENCOUNTER (OUTPATIENT)
Dept: LAB | Facility: CLINIC | Age: 39
Discharge: HOME | End: 2019-01-17
Attending: OBSTETRICS & GYNECOLOGY | Admitting: OBSTETRICS & GYNECOLOGY
Payer: COMMERCIAL

## 2019-01-17 DIAGNOSIS — N83.00 FOLLICULAR CYST OF OVARY: ICD-10-CM

## 2019-01-17 DIAGNOSIS — E28.2 POLYCYSTIC OVARIES: ICD-10-CM

## 2019-01-17 LAB
ESTRADIOL SERPL-MCNC: 299 PG/ML
FSH SERPL-ACNC: 5.8 IU/L
LH SERPL-ACNC: 9.1 IU/L
PROGEST SERPL-MCNC: 0.9 NG/ML

## 2019-01-17 PROCEDURE — 83001 ASSAY OF GONADOTROPIN (FSH): CPT | Performed by: OBSTETRICS & GYNECOLOGY

## 2019-01-17 PROCEDURE — 36415 COLL VENOUS BLD VENIPUNCTURE: CPT | Performed by: OBSTETRICS & GYNECOLOGY

## 2019-01-17 PROCEDURE — 84144 ASSAY OF PROGESTERONE: CPT | Performed by: OBSTETRICS & GYNECOLOGY

## 2019-01-17 PROCEDURE — 83002 ASSAY OF GONADOTROPIN (LH): CPT | Performed by: OBSTETRICS & GYNECOLOGY

## 2019-01-17 PROCEDURE — 82670 ASSAY OF TOTAL ESTRADIOL: CPT | Performed by: OBSTETRICS & GYNECOLOGY

## 2019-01-29 ENCOUNTER — HOSPITAL ENCOUNTER (OUTPATIENT)
Dept: LAB | Facility: CLINIC | Age: 39
Discharge: HOME | End: 2019-01-29
Attending: OBSTETRICS & GYNECOLOGY | Admitting: OBSTETRICS & GYNECOLOGY
Payer: COMMERCIAL

## 2019-01-29 DIAGNOSIS — E28.2 POLYCYSTIC OVARIES: Primary | ICD-10-CM

## 2019-01-29 DIAGNOSIS — N83.00 FOLLICULAR CYST OF OVARY: ICD-10-CM

## 2019-01-29 LAB
B-HCG SERPL-ACNC: 69 IU/L (ref 0–5)
ESTRADIOL SERPL-MCNC: 1431 PG/ML
PROGEST SERPL-MCNC: 47.4 NG/ML

## 2019-01-29 PROCEDURE — 82670 ASSAY OF TOTAL ESTRADIOL: CPT | Performed by: OBSTETRICS & GYNECOLOGY

## 2019-01-29 PROCEDURE — 84144 ASSAY OF PROGESTERONE: CPT | Performed by: OBSTETRICS & GYNECOLOGY

## 2019-01-29 PROCEDURE — 84702 CHORIONIC GONADOTROPIN TEST: CPT | Performed by: OBSTETRICS & GYNECOLOGY

## 2019-01-29 PROCEDURE — 36415 COLL VENOUS BLD VENIPUNCTURE: CPT | Performed by: OBSTETRICS & GYNECOLOGY

## 2019-02-06 ENCOUNTER — HOSPITAL ENCOUNTER (OUTPATIENT)
Dept: LAB | Facility: CLINIC | Age: 39
Discharge: HOME | End: 2019-02-06
Admitting: OBSTETRICS & GYNECOLOGY
Payer: COMMERCIAL

## 2019-02-06 DIAGNOSIS — N83.00 FOLLICULAR CYST OF OVARY: ICD-10-CM

## 2019-02-06 DIAGNOSIS — E28.2 POLYCYSTIC OVARIES: Primary | ICD-10-CM

## 2019-02-06 LAB
B-HCG SERPL-ACNC: 4 IU/L (ref 0–5)
PROGEST SERPL-MCNC: 27.7 NG/ML

## 2019-02-06 PROCEDURE — 36415 COLL VENOUS BLD VENIPUNCTURE: CPT | Performed by: OBSTETRICS & GYNECOLOGY

## 2019-02-06 PROCEDURE — 84702 CHORIONIC GONADOTROPIN TEST: CPT | Performed by: OBSTETRICS & GYNECOLOGY

## 2019-02-06 PROCEDURE — 84144 ASSAY OF PROGESTERONE: CPT | Performed by: OBSTETRICS & GYNECOLOGY

## 2019-02-08 ENCOUNTER — HOSPITAL ENCOUNTER (OUTPATIENT)
Dept: LAB | Facility: CLINIC | Age: 39
Discharge: HOME | End: 2019-02-08
Attending: OBSTETRICS & GYNECOLOGY | Admitting: OBSTETRICS & GYNECOLOGY
Payer: COMMERCIAL

## 2019-02-08 DIAGNOSIS — N83.00 FOLLICULAR CYST OF OVARY: ICD-10-CM

## 2019-02-08 DIAGNOSIS — E28.2 POLYCYSTIC OVARIES: ICD-10-CM

## 2019-02-08 LAB
B-HCG SERPL-ACNC: 2 IU/L (ref 0–5)
PROGEST SERPL-MCNC: 21.1 NG/ML

## 2019-02-08 PROCEDURE — 36415 COLL VENOUS BLD VENIPUNCTURE: CPT | Performed by: OBSTETRICS & GYNECOLOGY

## 2019-02-08 PROCEDURE — 84702 CHORIONIC GONADOTROPIN TEST: CPT | Performed by: OBSTETRICS & GYNECOLOGY

## 2019-02-08 PROCEDURE — 84144 ASSAY OF PROGESTERONE: CPT | Performed by: OBSTETRICS & GYNECOLOGY

## 2019-12-21 ENCOUNTER — HOSPITAL ENCOUNTER (OUTPATIENT)
Dept: LAB | Facility: CLINIC | Age: 39
Discharge: HOME | End: 2019-12-21
Attending: OBSTETRICS & GYNECOLOGY | Admitting: OBSTETRICS & GYNECOLOGY
Payer: COMMERCIAL

## 2019-12-21 DIAGNOSIS — E03.9 HYPOTHYROIDISM, ADULT: Primary | ICD-10-CM

## 2019-12-21 DIAGNOSIS — E28.2 POLYCYSTIC OVARIES: ICD-10-CM

## 2019-12-21 DIAGNOSIS — N83.00 FOLLICULAR CYST OF OVARY: ICD-10-CM

## 2019-12-21 LAB — TSH SERPL DL<=0.005 MIU/L-ACNC: 0.83 MU/L (ref 0.4–4)

## 2019-12-21 PROCEDURE — 36415 COLL VENOUS BLD VENIPUNCTURE: CPT | Performed by: OBSTETRICS & GYNECOLOGY

## 2019-12-21 PROCEDURE — 83520 IMMUNOASSAY QUANT NOS NONAB: CPT | Performed by: OBSTETRICS & GYNECOLOGY

## 2019-12-21 PROCEDURE — 84443 ASSAY THYROID STIM HORMONE: CPT | Performed by: OBSTETRICS & GYNECOLOGY

## 2019-12-24 LAB — MIS SERPL-MCNC: 0.02 NG/ML

## 2020-03-01 ENCOUNTER — HEALTH MAINTENANCE LETTER (OUTPATIENT)
Age: 40
End: 2020-03-01

## 2020-06-30 ENCOUNTER — HOSPITAL ENCOUNTER (OUTPATIENT)
Dept: LAB | Facility: CLINIC | Age: 40
Discharge: HOME | End: 2020-06-30
Attending: OBSTETRICS & GYNECOLOGY | Admitting: OBSTETRICS & GYNECOLOGY
Payer: COMMERCIAL

## 2020-06-30 DIAGNOSIS — E28.9 DISORDER OF ENDOCRINE OVARY: ICD-10-CM

## 2020-06-30 DIAGNOSIS — E03.9 HYPOTHYROIDISM, ADULT: Primary | ICD-10-CM

## 2020-06-30 LAB — TSH SERPL DL<=0.005 MIU/L-ACNC: 1.04 MU/L (ref 0.4–4)

## 2020-06-30 PROCEDURE — 36415 COLL VENOUS BLD VENIPUNCTURE: CPT | Performed by: OBSTETRICS & GYNECOLOGY

## 2020-06-30 PROCEDURE — 83520 IMMUNOASSAY QUANT NOS NONAB: CPT | Performed by: OBSTETRICS & GYNECOLOGY

## 2020-06-30 PROCEDURE — 84443 ASSAY THYROID STIM HORMONE: CPT | Performed by: OBSTETRICS & GYNECOLOGY

## 2020-07-03 LAB — MIS SERPL-MCNC: 0.02 NG/ML

## 2020-07-14 ENCOUNTER — MEDICAL CORRESPONDENCE (OUTPATIENT)
Dept: HEALTH INFORMATION MANAGEMENT | Facility: CLINIC | Age: 40
End: 2020-07-14

## 2020-07-15 ENCOUNTER — HOSPITAL ENCOUNTER (OUTPATIENT)
Dept: LAB | Facility: CLINIC | Age: 40
Discharge: HOME | End: 2020-07-15
Attending: OBSTETRICS & GYNECOLOGY | Admitting: OBSTETRICS & GYNECOLOGY
Payer: COMMERCIAL

## 2020-07-15 DIAGNOSIS — N83.00 FOLLICULAR CYST OF OVARY: ICD-10-CM

## 2020-07-15 DIAGNOSIS — E28.2 POLYCYSTIC OVARIES: Primary | ICD-10-CM

## 2020-07-15 LAB
B-HCG SERPL-ACNC: 1 IU/L (ref 0–5)
ESTRADIOL SERPL-MCNC: 69 PG/ML
FSH SERPL-ACNC: 33.9 IU/L
LH SERPL-ACNC: 55.6 IU/L
PROGEST SERPL-MCNC: 1 NG/ML

## 2020-07-15 PROCEDURE — 84702 CHORIONIC GONADOTROPIN TEST: CPT | Performed by: OBSTETRICS & GYNECOLOGY

## 2020-07-15 PROCEDURE — 36415 COLL VENOUS BLD VENIPUNCTURE: CPT | Performed by: OBSTETRICS & GYNECOLOGY

## 2020-07-15 PROCEDURE — 83002 ASSAY OF GONADOTROPIN (LH): CPT | Performed by: OBSTETRICS & GYNECOLOGY

## 2020-07-15 PROCEDURE — 84144 ASSAY OF PROGESTERONE: CPT | Performed by: OBSTETRICS & GYNECOLOGY

## 2020-07-15 PROCEDURE — 82670 ASSAY OF TOTAL ESTRADIOL: CPT | Performed by: OBSTETRICS & GYNECOLOGY

## 2020-07-15 PROCEDURE — 83001 ASSAY OF GONADOTROPIN (FSH): CPT | Performed by: OBSTETRICS & GYNECOLOGY

## 2020-11-06 ENCOUNTER — HOSPITAL PATHOLOGY (OUTPATIENT)
Dept: OTHER | Facility: CLINIC | Age: 40
End: 2020-11-06

## 2020-11-10 LAB — COPATH REPORT: NORMAL

## 2020-12-14 ENCOUNTER — HEALTH MAINTENANCE LETTER (OUTPATIENT)
Age: 40
End: 2020-12-14

## 2021-04-17 ENCOUNTER — HEALTH MAINTENANCE LETTER (OUTPATIENT)
Age: 41
End: 2021-04-17

## 2021-04-24 ENCOUNTER — HOSPITAL ENCOUNTER (OUTPATIENT)
Dept: LAB | Facility: CLINIC | Age: 41
Discharge: HOME | End: 2021-04-24
Attending: OBSTETRICS & GYNECOLOGY | Admitting: OBSTETRICS & GYNECOLOGY
Payer: COMMERCIAL

## 2021-04-24 DIAGNOSIS — N83.00 FOLLICULAR CYST OF OVARY: ICD-10-CM

## 2021-04-24 DIAGNOSIS — N92.5 OTHER SPECIFIED IRREGULAR MENSTRUATION: ICD-10-CM

## 2021-04-24 DIAGNOSIS — E28.2 POLYCYSTIC OVARIES: Primary | ICD-10-CM

## 2021-04-24 DIAGNOSIS — E28.9 DISORDER OF ENDOCRINE OVARY: ICD-10-CM

## 2021-04-24 LAB
ESTRADIOL SERPL-MCNC: <11 PG/ML
FSH SERPL-ACNC: 65.3 IU/L
LH SERPL-ACNC: 15 IU/L
PROGEST SERPL-MCNC: 0.9 NG/ML

## 2021-04-24 PROCEDURE — 36415 COLL VENOUS BLD VENIPUNCTURE: CPT | Performed by: OBSTETRICS & GYNECOLOGY

## 2021-04-24 PROCEDURE — 83001 ASSAY OF GONADOTROPIN (FSH): CPT | Performed by: OBSTETRICS & GYNECOLOGY

## 2021-04-24 PROCEDURE — 83002 ASSAY OF GONADOTROPIN (LH): CPT | Performed by: OBSTETRICS & GYNECOLOGY

## 2021-04-24 PROCEDURE — 82670 ASSAY OF TOTAL ESTRADIOL: CPT | Performed by: OBSTETRICS & GYNECOLOGY

## 2021-04-24 PROCEDURE — 84144 ASSAY OF PROGESTERONE: CPT | Performed by: OBSTETRICS & GYNECOLOGY

## 2021-04-26 ENCOUNTER — HOSPITAL ENCOUNTER (OUTPATIENT)
Dept: LAB | Facility: CLINIC | Age: 41
Discharge: HOME | End: 2021-04-26
Admitting: OBSTETRICS & GYNECOLOGY
Payer: COMMERCIAL

## 2021-04-26 DIAGNOSIS — E28.2 POLYCYSTIC OVARIES: Primary | ICD-10-CM

## 2021-04-26 DIAGNOSIS — N92.5 OTHER SPECIFIED IRREGULAR MENSTRUATION: ICD-10-CM

## 2021-04-26 DIAGNOSIS — N83.00 OVARIAN FOLLICULAR CYST: ICD-10-CM

## 2021-04-26 DIAGNOSIS — E28.9 OVARIAN DYSFUNCTION, UNSPECIFIED: ICD-10-CM

## 2021-04-26 LAB
ESTRADIOL SERPL-MCNC: 17 PG/ML
FSH SERPL-ACNC: 59.8 IU/L
LH SERPL-ACNC: 18.2 IU/L
PROGEST SERPL-MCNC: 0.9 NG/ML

## 2021-04-26 PROCEDURE — 82670 ASSAY OF TOTAL ESTRADIOL: CPT | Performed by: OBSTETRICS & GYNECOLOGY

## 2021-04-26 PROCEDURE — 36415 COLL VENOUS BLD VENIPUNCTURE: CPT | Performed by: OBSTETRICS & GYNECOLOGY

## 2021-04-26 PROCEDURE — 83002 ASSAY OF GONADOTROPIN (LH): CPT | Performed by: OBSTETRICS & GYNECOLOGY

## 2021-04-26 PROCEDURE — 83001 ASSAY OF GONADOTROPIN (FSH): CPT | Performed by: OBSTETRICS & GYNECOLOGY

## 2021-04-26 PROCEDURE — 84144 ASSAY OF PROGESTERONE: CPT | Performed by: OBSTETRICS & GYNECOLOGY

## 2021-04-30 ENCOUNTER — HOSPITAL ENCOUNTER (OUTPATIENT)
Dept: LAB | Facility: CLINIC | Age: 41
Discharge: HOME | End: 2021-04-30
Attending: OBSTETRICS & GYNECOLOGY | Admitting: OBSTETRICS & GYNECOLOGY
Payer: COMMERCIAL

## 2021-04-30 DIAGNOSIS — N92.5 RETROGRADE MENSTRUATION: ICD-10-CM

## 2021-04-30 DIAGNOSIS — E28.9 DISORDER OF ENDOCRINE OVARY: ICD-10-CM

## 2021-04-30 DIAGNOSIS — E28.2 POLYCYSTIC OVARIES: Primary | ICD-10-CM

## 2021-04-30 DIAGNOSIS — E23.0 LH (LUTEINIZING HORMONE) DEFICIENCY (H): ICD-10-CM

## 2021-04-30 DIAGNOSIS — N83.00 FOLLICULAR CYST OF OVARY: ICD-10-CM

## 2021-04-30 DIAGNOSIS — N83.00 FOLLICLE CYST: ICD-10-CM

## 2021-04-30 LAB
ESTRADIOL SERPL-MCNC: <11 PG/ML
FSH SERPL-ACNC: 65.4 IU/L
LH SERPL-ACNC: 23.4 IU/L
PROGEST SERPL-MCNC: 0.8 NG/ML

## 2021-04-30 PROCEDURE — 36415 COLL VENOUS BLD VENIPUNCTURE: CPT | Performed by: OBSTETRICS & GYNECOLOGY

## 2021-04-30 PROCEDURE — 83001 ASSAY OF GONADOTROPIN (FSH): CPT | Performed by: OBSTETRICS & GYNECOLOGY

## 2021-04-30 PROCEDURE — 84144 ASSAY OF PROGESTERONE: CPT | Performed by: OBSTETRICS & GYNECOLOGY

## 2021-04-30 PROCEDURE — 82670 ASSAY OF TOTAL ESTRADIOL: CPT | Performed by: OBSTETRICS & GYNECOLOGY

## 2021-04-30 PROCEDURE — 83002 ASSAY OF GONADOTROPIN (LH): CPT | Performed by: OBSTETRICS & GYNECOLOGY

## 2021-05-01 ENCOUNTER — HOSPITAL ENCOUNTER (OUTPATIENT)
Dept: LAB | Facility: CLINIC | Age: 41
Discharge: HOME | End: 2021-05-01
Admitting: OBSTETRICS & GYNECOLOGY
Payer: COMMERCIAL

## 2021-05-01 DIAGNOSIS — E28.2 POLYCYSTIC OVARIES: ICD-10-CM

## 2021-05-01 DIAGNOSIS — E23.0 LH (LUTEINIZING HORMONE) DEFICIENCY (H): ICD-10-CM

## 2021-05-01 DIAGNOSIS — E28.9 DISORDER OF ENDOCRINE OVARY: ICD-10-CM

## 2021-05-01 DIAGNOSIS — N92.5 RETROGRADE MENSTRUATION: ICD-10-CM

## 2021-05-01 DIAGNOSIS — N83.00 FOLLICLE CYST: ICD-10-CM

## 2021-05-01 LAB
ESTRADIOL SERPL-MCNC: <11 PG/ML
FSH SERPL-ACNC: 66.9 IU/L
LH SERPL-ACNC: 19.2 IU/L
PROGEST SERPL-MCNC: 0.8 NG/ML

## 2021-05-01 PROCEDURE — 84144 ASSAY OF PROGESTERONE: CPT | Performed by: OBSTETRICS & GYNECOLOGY

## 2021-05-01 PROCEDURE — 82670 ASSAY OF TOTAL ESTRADIOL: CPT | Performed by: OBSTETRICS & GYNECOLOGY

## 2021-05-01 PROCEDURE — 36415 COLL VENOUS BLD VENIPUNCTURE: CPT | Performed by: OBSTETRICS & GYNECOLOGY

## 2021-05-01 PROCEDURE — 83001 ASSAY OF GONADOTROPIN (FSH): CPT | Performed by: OBSTETRICS & GYNECOLOGY

## 2021-05-01 PROCEDURE — 83002 ASSAY OF GONADOTROPIN (LH): CPT | Performed by: OBSTETRICS & GYNECOLOGY

## 2021-05-10 ENCOUNTER — HOSPITAL ENCOUNTER (OUTPATIENT)
Dept: LAB | Facility: CLINIC | Age: 41
Discharge: HOME | End: 2021-05-10
Attending: OBSTETRICS & GYNECOLOGY | Admitting: OBSTETRICS & GYNECOLOGY
Payer: COMMERCIAL

## 2021-05-10 DIAGNOSIS — N83.00 FOLLICULAR CYST OF OVARY: ICD-10-CM

## 2021-05-10 DIAGNOSIS — E28.2 POLYCYSTIC OVARIES: Primary | ICD-10-CM

## 2021-05-10 DIAGNOSIS — N92.5 OTHER SPECIFIED IRREGULAR MENSTRUATION: ICD-10-CM

## 2021-05-10 LAB
ESTRADIOL SERPL-MCNC: 17 PG/ML
FSH SERPL-ACNC: 59.5 IU/L
LH SERPL-ACNC: 33 IU/L
PROGEST SERPL-MCNC: 1 NG/ML

## 2021-05-10 PROCEDURE — 83002 ASSAY OF GONADOTROPIN (LH): CPT | Performed by: OBSTETRICS & GYNECOLOGY

## 2021-05-10 PROCEDURE — 82670 ASSAY OF TOTAL ESTRADIOL: CPT | Performed by: OBSTETRICS & GYNECOLOGY

## 2021-05-10 PROCEDURE — 83001 ASSAY OF GONADOTROPIN (FSH): CPT | Performed by: OBSTETRICS & GYNECOLOGY

## 2021-05-10 PROCEDURE — 84144 ASSAY OF PROGESTERONE: CPT | Performed by: OBSTETRICS & GYNECOLOGY

## 2021-05-10 PROCEDURE — 36415 COLL VENOUS BLD VENIPUNCTURE: CPT | Performed by: OBSTETRICS & GYNECOLOGY

## 2021-05-17 ENCOUNTER — HOSPITAL ENCOUNTER (OUTPATIENT)
Dept: LAB | Facility: CLINIC | Age: 41
Discharge: HOME | End: 2021-05-17
Attending: OBSTETRICS & GYNECOLOGY | Admitting: OBSTETRICS & GYNECOLOGY
Payer: COMMERCIAL

## 2021-05-17 DIAGNOSIS — E28.2 POLYCYSTIC OVARIES: ICD-10-CM

## 2021-05-17 DIAGNOSIS — N92.5 OTHER SPECIFIED IRREGULAR MENSTRUATION: ICD-10-CM

## 2021-05-17 DIAGNOSIS — N83.00 FOLLICULAR CYST OF OVARY: ICD-10-CM

## 2021-05-17 LAB
ESTRADIOL SERPL-MCNC: 25 PG/ML
FSH SERPL-ACNC: 62.6 IU/L
LH SERPL-ACNC: 40.3 IU/L
PROGEST SERPL-MCNC: 1.1 NG/ML

## 2021-05-17 PROCEDURE — 36415 COLL VENOUS BLD VENIPUNCTURE: CPT | Performed by: OBSTETRICS & GYNECOLOGY

## 2021-05-17 PROCEDURE — 83001 ASSAY OF GONADOTROPIN (FSH): CPT | Performed by: OBSTETRICS & GYNECOLOGY

## 2021-05-17 PROCEDURE — 83002 ASSAY OF GONADOTROPIN (LH): CPT | Performed by: OBSTETRICS & GYNECOLOGY

## 2021-05-17 PROCEDURE — 84144 ASSAY OF PROGESTERONE: CPT | Performed by: OBSTETRICS & GYNECOLOGY

## 2021-05-17 PROCEDURE — 82670 ASSAY OF TOTAL ESTRADIOL: CPT | Performed by: OBSTETRICS & GYNECOLOGY

## 2021-05-19 ENCOUNTER — HOSPITAL ENCOUNTER (OUTPATIENT)
Dept: LAB | Facility: CLINIC | Age: 41
Discharge: HOME | End: 2021-05-19
Attending: OBSTETRICS & GYNECOLOGY | Admitting: OBSTETRICS & GYNECOLOGY
Payer: COMMERCIAL

## 2021-05-19 DIAGNOSIS — E28.2 POLYCYSTIC OVARIES: Primary | ICD-10-CM

## 2021-05-19 DIAGNOSIS — N83.00 FOLLICLE CYST: ICD-10-CM

## 2021-05-19 DIAGNOSIS — N92.5 OTHER SPECIFIED IRREGULAR MENSTRUATION: ICD-10-CM

## 2021-05-19 LAB
ESTRADIOL SERPL-MCNC: 27 PG/ML
FSH SERPL-ACNC: 63.7 IU/L
LH SERPL-ACNC: 40.5 IU/L
PROGEST SERPL-MCNC: 1 NG/ML

## 2021-05-19 PROCEDURE — 82670 ASSAY OF TOTAL ESTRADIOL: CPT | Performed by: OBSTETRICS & GYNECOLOGY

## 2021-05-19 PROCEDURE — 83001 ASSAY OF GONADOTROPIN (FSH): CPT | Performed by: OBSTETRICS & GYNECOLOGY

## 2021-05-19 PROCEDURE — 36415 COLL VENOUS BLD VENIPUNCTURE: CPT | Performed by: OBSTETRICS & GYNECOLOGY

## 2021-05-19 PROCEDURE — 83002 ASSAY OF GONADOTROPIN (LH): CPT | Performed by: OBSTETRICS & GYNECOLOGY

## 2021-05-19 PROCEDURE — 84144 ASSAY OF PROGESTERONE: CPT | Performed by: OBSTETRICS & GYNECOLOGY

## 2021-05-26 ENCOUNTER — HOSPITAL ENCOUNTER (OUTPATIENT)
Dept: LAB | Facility: CLINIC | Age: 41
Discharge: HOME | End: 2021-05-26
Attending: OBSTETRICS & GYNECOLOGY | Admitting: OBSTETRICS & GYNECOLOGY
Payer: COMMERCIAL

## 2021-05-26 DIAGNOSIS — E28.9 DISORDER OF ENDOCRINE OVARY: ICD-10-CM

## 2021-05-26 DIAGNOSIS — N92.5 RETROGRADE MENSTRUATION: ICD-10-CM

## 2021-05-26 DIAGNOSIS — N83.00 FOLLICULAR CYST OF OVARY: ICD-10-CM

## 2021-05-26 DIAGNOSIS — E28.2 POLYCYSTIC OVARIES: Primary | ICD-10-CM

## 2021-05-26 DIAGNOSIS — E28.9: ICD-10-CM

## 2021-05-26 LAB
ESTRADIOL SERPL-MCNC: 31 PG/ML
FSH SERPL-ACNC: 52.4 IU/L
LH SERPL-ACNC: 37.1 IU/L
PROGEST SERPL-MCNC: 0.4 NG/ML

## 2021-05-26 PROCEDURE — 83002 ASSAY OF GONADOTROPIN (LH): CPT | Performed by: OBSTETRICS & GYNECOLOGY

## 2021-05-26 PROCEDURE — 82670 ASSAY OF TOTAL ESTRADIOL: CPT | Performed by: OBSTETRICS & GYNECOLOGY

## 2021-05-26 PROCEDURE — 83001 ASSAY OF GONADOTROPIN (FSH): CPT | Performed by: OBSTETRICS & GYNECOLOGY

## 2021-05-26 PROCEDURE — 84144 ASSAY OF PROGESTERONE: CPT | Performed by: OBSTETRICS & GYNECOLOGY

## 2021-05-26 PROCEDURE — 36415 COLL VENOUS BLD VENIPUNCTURE: CPT | Performed by: OBSTETRICS & GYNECOLOGY

## 2021-06-21 NOTE — NURSING NOTE
Screening Questionnaire for Adult Immunization    Are you sick today?   No   Do you have allergies to medications, food, a vaccine component or latex?   No   Have you ever had a serious reaction after receiving a vaccination?   No   Do you have a long-term health problem with heart disease, lung disease, asthma, kidney disease, metabolic disease (e.g. diabetes), anemia, or other blood disorder?   No   Do you have cancer, leukemia, HIV/AIDS, or any other immune system problem?   No   In the past 3 months, have you taken medications that affect  your immune system, such as prednisone, other steroids, or anticancer drugs; drugs for the treatment of rheumatoid arthritis, Crohn s disease, or psoriasis; or have you had radiation treatments?   No   Have you had a seizure, or a brain or other nervous system problem?   No   During the past year, have you received a transfusion of blood or blood     products, or been given immune (gamma) globulin or antiviral drug?   No   For women: Are you pregnant or is there a chance you could become        pregnant during the next month?   No   Have you received any vaccinations in the past 4 weeks?   No     Immunization questionnaire answers were all negative.      Prior to injection verified patient identity using patient's name and date of birth.  Due to injection administration, patient instructed to remain in clinic for 15 minutes  afterwards, and to report any adverse reaction to me immediately.      Per orders of Dr. Palma, injection of Flu given by Mery Caputo. Patient instructed to remain in clinic for 15 minutes afterwards, and to report any adverse reaction to me immediately.       Screening performed by Mery Caputo on 9/12/2018 at 3:37 PM.     Solavei message sent to pt

## 2021-07-05 ENCOUNTER — MEDICAL CORRESPONDENCE (OUTPATIENT)
Dept: HEALTH INFORMATION MANAGEMENT | Facility: CLINIC | Age: 41
End: 2021-07-05

## 2021-07-09 ENCOUNTER — HOSPITAL ENCOUNTER (OUTPATIENT)
Dept: LAB | Facility: CLINIC | Age: 41
Discharge: HOME | End: 2021-07-09
Attending: OBSTETRICS & GYNECOLOGY | Admitting: OBSTETRICS & GYNECOLOGY
Payer: COMMERCIAL

## 2021-07-09 DIAGNOSIS — E28.9 OVARIAN DYSFUNCTION: ICD-10-CM

## 2021-07-09 DIAGNOSIS — N92.5 RETROGRADE MENSTRUATION: ICD-10-CM

## 2021-07-09 DIAGNOSIS — E28.2 POLYCYSTIC OVARIAN DISEASE: Primary | ICD-10-CM

## 2021-07-09 DIAGNOSIS — N83.00 FOLLICLE CYST: ICD-10-CM

## 2021-07-09 DIAGNOSIS — E28.9 DYSFUNCTION OVARIAN: ICD-10-CM

## 2021-07-09 DIAGNOSIS — E28.2 POLYCYSTIC OVARIES: Primary | ICD-10-CM

## 2021-07-09 DIAGNOSIS — N83.00 FOLLICULAR CYST OF OVARY: ICD-10-CM

## 2021-07-09 DIAGNOSIS — N92.5 OTHER SPECIFIED IRREGULAR MENSTRUATION: ICD-10-CM

## 2021-07-09 LAB
ESTRADIOL SERPL-MCNC: 82 PG/ML
FSH SERPL-ACNC: 11 IU/L
LH SERPL-ACNC: 23 IU/L
PROGEST SERPL-MCNC: 0.9 NG/ML

## 2021-07-09 PROCEDURE — 84144 ASSAY OF PROGESTERONE: CPT | Performed by: OBSTETRICS & GYNECOLOGY

## 2021-07-09 PROCEDURE — 83001 ASSAY OF GONADOTROPIN (FSH): CPT | Performed by: OBSTETRICS & GYNECOLOGY

## 2021-07-09 PROCEDURE — 83002 ASSAY OF GONADOTROPIN (LH): CPT | Performed by: OBSTETRICS & GYNECOLOGY

## 2021-07-09 PROCEDURE — 82670 ASSAY OF TOTAL ESTRADIOL: CPT | Performed by: OBSTETRICS & GYNECOLOGY

## 2021-07-09 PROCEDURE — 36415 COLL VENOUS BLD VENIPUNCTURE: CPT | Performed by: OBSTETRICS & GYNECOLOGY

## 2021-07-10 DIAGNOSIS — N92.5: ICD-10-CM

## 2021-07-10 DIAGNOSIS — E28.9 DISORDER OF ENDOCRINE OVARY: ICD-10-CM

## 2021-07-10 DIAGNOSIS — E28.2 POLYCYSTIC OVARIES: Primary | ICD-10-CM

## 2021-07-10 DIAGNOSIS — N83.00 FOLLICULAR CYST OF OVARY: ICD-10-CM

## 2021-07-12 ENCOUNTER — LAB (OUTPATIENT)
Dept: LAB | Facility: CLINIC | Age: 41
End: 2021-07-12
Attending: OBSTETRICS & GYNECOLOGY
Payer: COMMERCIAL

## 2021-07-12 DIAGNOSIS — E28.2 POLYCYSTIC OVARIES: ICD-10-CM

## 2021-07-12 DIAGNOSIS — N92.5: ICD-10-CM

## 2021-07-12 DIAGNOSIS — E28.9 DISORDER OF ENDOCRINE OVARY: ICD-10-CM

## 2021-07-12 DIAGNOSIS — N83.00 FOLLICULAR CYST OF OVARY: ICD-10-CM

## 2021-07-12 PROCEDURE — 82670 ASSAY OF TOTAL ESTRADIOL: CPT

## 2021-07-12 PROCEDURE — 36415 COLL VENOUS BLD VENIPUNCTURE: CPT

## 2021-07-12 PROCEDURE — 84144 ASSAY OF PROGESTERONE: CPT

## 2021-07-12 PROCEDURE — 83002 ASSAY OF GONADOTROPIN (LH): CPT

## 2021-07-12 PROCEDURE — 83001 ASSAY OF GONADOTROPIN (FSH): CPT

## 2021-07-13 LAB
ESTRADIOL SERPL-MCNC: 64 PG/ML
FSH SERPL-ACNC: 10.3 IU/L
LH SERPL-ACNC: 2.5 IU/L
PROGEST SERPL-MCNC: 0.7 NG/ML

## 2021-07-19 ENCOUNTER — LAB (OUTPATIENT)
Dept: LAB | Facility: CLINIC | Age: 41
End: 2021-07-19
Attending: OBSTETRICS & GYNECOLOGY
Payer: COMMERCIAL

## 2021-07-19 DIAGNOSIS — N83.00 FOLLICLE CYST: ICD-10-CM

## 2021-07-19 DIAGNOSIS — E28.9 OVARIAN DYSFUNCTION: ICD-10-CM

## 2021-07-19 DIAGNOSIS — E28.2 POLYCYSTIC OVARIAN DISEASE: ICD-10-CM

## 2021-07-19 DIAGNOSIS — N92.5 OTHER SPECIFIED IRREGULAR MENSTRUATION: ICD-10-CM

## 2021-07-19 LAB
ESTRADIOL SERPL-MCNC: 36 PG/ML
FSH SERPL-ACNC: 30.9 IU/L
LH SERPL-ACNC: 8.6 IU/L
PROGEST SERPL-MCNC: 1.6 NG/ML

## 2021-07-19 PROCEDURE — 83001 ASSAY OF GONADOTROPIN (FSH): CPT

## 2021-07-19 PROCEDURE — 82670 ASSAY OF TOTAL ESTRADIOL: CPT

## 2021-07-19 PROCEDURE — 83002 ASSAY OF GONADOTROPIN (LH): CPT

## 2021-07-19 PROCEDURE — 36415 COLL VENOUS BLD VENIPUNCTURE: CPT

## 2021-07-19 PROCEDURE — 84144 ASSAY OF PROGESTERONE: CPT

## 2021-07-26 ENCOUNTER — MEDICAL CORRESPONDENCE (OUTPATIENT)
Dept: HEALTH INFORMATION MANAGEMENT | Facility: CLINIC | Age: 41
End: 2021-07-26

## 2021-07-26 ENCOUNTER — LAB (OUTPATIENT)
Dept: LAB | Facility: CLINIC | Age: 41
End: 2021-07-26
Payer: COMMERCIAL

## 2021-07-26 DIAGNOSIS — O09.00 PREGNANCY WITH HISTORY OF INFERTILITY: Primary | ICD-10-CM

## 2021-07-26 DIAGNOSIS — O20.0 THREATENED ABORTION: ICD-10-CM

## 2021-07-26 DIAGNOSIS — E28.2 POLYCYSTIC OVARIES: ICD-10-CM

## 2021-07-26 DIAGNOSIS — N92.5 RETROGRADE MENSTRUATION: ICD-10-CM

## 2021-07-26 DIAGNOSIS — N83.00 FOLLICULAR CYST OF OVARY: ICD-10-CM

## 2021-07-26 DIAGNOSIS — E28.9 DISORDER OF ENDOCRINE OVARY: ICD-10-CM

## 2021-07-26 LAB
B-HCG SERPL-ACNC: 6 IU/L (ref 0–5)
ESTRADIOL SERPL-MCNC: 76 PG/ML
FSH SERPL-ACNC: 19 IU/L
LH SERPL-ACNC: 9 IU/L
PROGEST SERPL-MCNC: 1.3 NG/ML

## 2021-07-26 PROCEDURE — 83002 ASSAY OF GONADOTROPIN (LH): CPT

## 2021-07-26 PROCEDURE — 84144 ASSAY OF PROGESTERONE: CPT

## 2021-07-26 PROCEDURE — 36415 COLL VENOUS BLD VENIPUNCTURE: CPT

## 2021-07-26 PROCEDURE — 82670 ASSAY OF TOTAL ESTRADIOL: CPT

## 2021-07-26 PROCEDURE — 83001 ASSAY OF GONADOTROPIN (FSH): CPT

## 2021-07-26 PROCEDURE — 84702 CHORIONIC GONADOTROPIN TEST: CPT

## 2021-08-02 ENCOUNTER — LAB (OUTPATIENT)
Dept: LAB | Facility: CLINIC | Age: 41
End: 2021-08-02
Attending: OBSTETRICS & GYNECOLOGY
Payer: COMMERCIAL

## 2021-08-02 DIAGNOSIS — N92.5 RETROGRADE MENSTRUATION: ICD-10-CM

## 2021-08-02 DIAGNOSIS — O20.0 THREATENED ABORTION: ICD-10-CM

## 2021-08-02 DIAGNOSIS — O09.00 PREGNANCY WITH HISTORY OF INFERTILITY: ICD-10-CM

## 2021-08-02 DIAGNOSIS — E28.2 POLYCYSTIC OVARIES: ICD-10-CM

## 2021-08-02 LAB
B-HCG SERPL-ACNC: <1 IU/L (ref 0–5)
ESTRADIOL SERPL-MCNC: 225 PG/ML
FSH SERPL-ACNC: 10.4 IU/L
LH SERPL-ACNC: 10.7 IU/L
PROGEST SERPL-MCNC: 0.6 NG/ML

## 2021-08-02 PROCEDURE — 83001 ASSAY OF GONADOTROPIN (FSH): CPT

## 2021-08-02 PROCEDURE — 84144 ASSAY OF PROGESTERONE: CPT

## 2021-08-02 PROCEDURE — 84702 CHORIONIC GONADOTROPIN TEST: CPT

## 2021-08-02 PROCEDURE — 36415 COLL VENOUS BLD VENIPUNCTURE: CPT

## 2021-08-02 PROCEDURE — 83002 ASSAY OF GONADOTROPIN (LH): CPT

## 2021-08-02 PROCEDURE — 82670 ASSAY OF TOTAL ESTRADIOL: CPT

## 2021-08-10 ENCOUNTER — LAB (OUTPATIENT)
Dept: LAB | Facility: CLINIC | Age: 41
End: 2021-08-10
Payer: COMMERCIAL

## 2021-08-10 ENCOUNTER — MEDICAL CORRESPONDENCE (OUTPATIENT)
Dept: HEALTH INFORMATION MANAGEMENT | Facility: CLINIC | Age: 41
End: 2021-08-10

## 2021-08-10 DIAGNOSIS — E28.2 POLYCYSTIC OVARIES: Primary | ICD-10-CM

## 2021-08-10 DIAGNOSIS — E28.9 DISORDER OF ENDOCRINE OVARY: ICD-10-CM

## 2021-08-10 DIAGNOSIS — N83.00 FOLLICULAR CYST OF OVARY: ICD-10-CM

## 2021-08-10 DIAGNOSIS — N92.5: ICD-10-CM

## 2021-08-10 LAB
ESTRADIOL SERPL-MCNC: 366 PG/ML
FSH SERPL-ACNC: 1.2 IU/L
LH SERPL-ACNC: 0.9 IU/L
PROGEST SERPL-MCNC: 14.2 NG/ML

## 2021-08-10 PROCEDURE — 82670 ASSAY OF TOTAL ESTRADIOL: CPT

## 2021-08-10 PROCEDURE — 83001 ASSAY OF GONADOTROPIN (FSH): CPT

## 2021-08-10 PROCEDURE — 83002 ASSAY OF GONADOTROPIN (LH): CPT

## 2021-08-10 PROCEDURE — 36415 COLL VENOUS BLD VENIPUNCTURE: CPT

## 2021-08-10 PROCEDURE — 84144 ASSAY OF PROGESTERONE: CPT

## 2021-08-20 ENCOUNTER — LAB (OUTPATIENT)
Dept: LAB | Facility: CLINIC | Age: 41
End: 2021-08-20
Payer: COMMERCIAL

## 2021-08-20 DIAGNOSIS — N92.5 RETROGRADE MENSTRUATION: ICD-10-CM

## 2021-08-20 DIAGNOSIS — N83.00 FOLLICULAR CYST OF OVARY: ICD-10-CM

## 2021-08-20 DIAGNOSIS — E28.2 POLYCYSTIC OVARIES: Primary | ICD-10-CM

## 2021-08-20 LAB
B-HCG SERPL-ACNC: 1 IU/L (ref 0–5)
PROGEST SERPL-MCNC: 2.1 NG/ML

## 2021-08-20 PROCEDURE — 84702 CHORIONIC GONADOTROPIN TEST: CPT

## 2021-08-20 PROCEDURE — 36415 COLL VENOUS BLD VENIPUNCTURE: CPT

## 2021-08-20 PROCEDURE — 84144 ASSAY OF PROGESTERONE: CPT

## 2021-10-02 ENCOUNTER — HEALTH MAINTENANCE LETTER (OUTPATIENT)
Age: 41
End: 2021-10-02

## 2021-11-27 ENCOUNTER — HEALTH MAINTENANCE LETTER (OUTPATIENT)
Age: 41
End: 2021-11-27

## 2022-01-30 ENCOUNTER — LAB (OUTPATIENT)
Dept: LAB | Facility: CLINIC | Age: 42
End: 2022-01-30
Payer: COMMERCIAL

## 2022-01-30 DIAGNOSIS — E28.2 POLYCYSTIC OVARIES: Primary | ICD-10-CM

## 2022-01-30 DIAGNOSIS — N92.5 OTHER SPECIFIED IRREGULAR MENSTRUATION: ICD-10-CM

## 2022-01-30 DIAGNOSIS — N83.00 FOLLICULAR CYST OF OVARY: ICD-10-CM

## 2022-01-30 DIAGNOSIS — E28.9 OVARIAN DYSFUNCTION: ICD-10-CM

## 2022-01-30 LAB
ESTRADIOL SERPL-MCNC: 211 PG/ML
FSH SERPL-ACNC: 12.1 IU/L
LH SERPL-ACNC: 13.9 IU/L
PROGEST SERPL-MCNC: 1.2 NG/ML

## 2022-01-30 PROCEDURE — 36415 COLL VENOUS BLD VENIPUNCTURE: CPT

## 2022-01-30 PROCEDURE — 82670 ASSAY OF TOTAL ESTRADIOL: CPT

## 2022-01-30 PROCEDURE — 83002 ASSAY OF GONADOTROPIN (LH): CPT

## 2022-01-30 PROCEDURE — 84144 ASSAY OF PROGESTERONE: CPT

## 2022-01-30 PROCEDURE — 83001 ASSAY OF GONADOTROPIN (FSH): CPT

## 2022-04-07 ENCOUNTER — LAB (OUTPATIENT)
Dept: LAB | Facility: CLINIC | Age: 42
End: 2022-04-07
Payer: COMMERCIAL

## 2022-04-07 DIAGNOSIS — N83.00 FOLLICULAR CYST OF OVARY: ICD-10-CM

## 2022-04-07 DIAGNOSIS — N92.5 RETROGRADE MENSTRUATION: ICD-10-CM

## 2022-04-07 DIAGNOSIS — E28.2 POLYCYSTIC OVARIES: Primary | ICD-10-CM

## 2022-04-07 LAB
B-HCG SERPL-ACNC: 1 IU/L (ref 0–5)
PROGEST SERPL-MCNC: 0.9 NG/ML

## 2022-04-07 PROCEDURE — 36415 COLL VENOUS BLD VENIPUNCTURE: CPT

## 2022-04-07 PROCEDURE — 84144 ASSAY OF PROGESTERONE: CPT

## 2022-04-07 PROCEDURE — 84702 CHORIONIC GONADOTROPIN TEST: CPT

## 2022-09-21 ENCOUNTER — TELEPHONE (OUTPATIENT)
Dept: OPHTHALMOLOGY | Facility: CLINIC | Age: 42
End: 2022-09-21

## 2022-09-21 NOTE — TELEPHONE ENCOUNTER
LVM for patient regarding scheduling with  for: bleph revision consult.  [facility] has the soonest available in late October or in Mid November at [facility]. Provided direct number for scheduling. ('s friend)

## 2022-09-22 ENCOUNTER — TELEPHONE (OUTPATIENT)
Dept: OPHTHALMOLOGY | Facility: CLINIC | Age: 42
End: 2022-09-22

## 2022-09-23 ENCOUNTER — TELEPHONE (OUTPATIENT)
Dept: OPHTHALMOLOGY | Facility: CLINIC | Age: 42
End: 2022-09-23

## 2022-09-23 NOTE — TELEPHONE ENCOUNTER
Patient returned VM regarding scheduling with  for: bleph revision consult. ('s friend) . Scheduled patient accordingly and sent appointment letter to confirmed address. Added patient to the wait-list at both locations.-Per Patient

## 2022-11-02 ENCOUNTER — OFFICE VISIT (OUTPATIENT)
Dept: OPHTHALMOLOGY | Facility: CLINIC | Age: 42
End: 2022-11-02
Payer: COMMERCIAL

## 2022-11-02 DIAGNOSIS — H02.834 DERMATOCHALASIS OF BOTH UPPER EYELIDS: Primary | ICD-10-CM

## 2022-11-02 DIAGNOSIS — H11.9 CONJUNCTIVAL LESION: ICD-10-CM

## 2022-11-02 DIAGNOSIS — H04.561 PUNCTAL STENOSIS, ACQUIRED, RIGHT: ICD-10-CM

## 2022-11-02 DIAGNOSIS — H57.811 BROW PTOSIS, RIGHT: ICD-10-CM

## 2022-11-02 DIAGNOSIS — H02.831 DERMATOCHALASIS OF BOTH UPPER EYELIDS: Primary | ICD-10-CM

## 2022-11-02 PROCEDURE — 92081 LIMITED VISUAL FIELD XM: CPT | Performed by: OPHTHALMOLOGY

## 2022-11-02 PROCEDURE — 99204 OFFICE O/P NEW MOD 45 MIN: CPT | Performed by: OPHTHALMOLOGY

## 2022-11-02 PROCEDURE — 92285 EXTERNAL OCULAR PHOTOGRAPHY: CPT | Performed by: OPHTHALMOLOGY

## 2022-11-02 ASSESSMENT — SLIT LAMP EXAM - LIDS: COMMENTS: HEAVY DERMATOCHALASIS RESTING ON LASHES

## 2022-11-02 ASSESSMENT — EXTERNAL EXAM - RIGHT EYE: OD_EXAM: BROW PTOSIS, WITH FRONTALIS RELAXED, BROW IS BELOW SUPERIOR ORBITAL RIM AND LATERALLY INLINE WITH UPPER LASHES

## 2022-11-02 ASSESSMENT — VISUAL ACUITY
OD_SC: 20/15
OS_SC+: -3
METHOD: SNELLEN - LINEAR
OS_SC: 20/15

## 2022-11-02 ASSESSMENT — TONOMETRY
OS_IOP_MMHG: 19
OD_IOP_MMHG: 18
IOP_METHOD: ICARE

## 2022-11-02 ASSESSMENT — EXTERNAL EXAM - LEFT EYE: OS_EXAM: BROW PTOSIS, WITH FRONTALIS RELAXED, BROW IS BELOW SUPERIOR ORBITAL RIM AND LATERALLY INLINE WITH UPPER LASHES

## 2022-11-02 ASSESSMENT — CONF VISUAL FIELD: COMMENTS: TANGENT VISUAL FIELD PERFORMED TODAY.

## 2022-11-02 NOTE — PROGRESS NOTES
Oculoplastic Clinic New Patient    Patient: Rene Gilbert MRN# [id number]   YOB: 1980 Age: 42 year old   Date of Visit: Nov 2, 2022    CC: Droopy eyelids obstructing vision.              HPI:     Chief Complaint(s) and History of Present Illness(es)     Droopy Eye Lid Evaluation            Laterality: right upper lid and left upper lid          Comments    Patient here for consultation for droopy lids. Pt states she had lid   surgery about 3 years ago in [place] and is not happy with the results.   Feels skin/lids are still drooping, left worse than right.         Rene Gilbert is a 42 year old female who has noted gradual onset of droopy eyelids over the past years. The droopy eyelid is interfering with activities of daily living including driving, and reading. Despite surgery in [place] she continues to have significant heaviness. The patient denies double vision, variability of the eyelid position, or dry eye symptoms.     Additionally she has an eyelid margin lesion present for at least 20 years but it seems bigger and is now very irritating.     EXAM:     MRD1: 2 mm both eyes   Dermatochalasis with excess skin touching eyelashes   Brow ptosis with brow resting below superior orbital rim     VISUAL FIELD:  Right eye untaped:25 degrees Right eye taped:50 degrees  Left eye untaped:25 degrees Left eye taped:50 degrees    Assessment & Plan     Rene Gilbert is a 42 year old female with the following diagnoses:   1. Dermatochalasis of both upper eyelids    2. Brow ptosis, right    3. Conjunctival lesion       Uncertain what the conjunctival lesion is, it is now encasing the upper punctum. ? Viral vs nevus    Revision both upper eyelid blepharoplasty (skin, no fat, B IBP) 99688. She has a nasal area of hollowing right eye, if there is any nasal fat can try to mobilize. Excision of right upper eyelid conjunctival lesion, and placement of mini Monoka. Send to pathology.     Referring provider: Dr. Whitehead's  friend.   ANTICOAGULATION:         PHOTOS DEMONSTRATE:    Significant dermatochalasis with lids resting on eyelashes and obstructing visual axis  Eyelid lesion  Brow ptosis with thicker brow skin and hairs below the lateral superior orbital rim    Attending Physician Attestation: Complete documentation of historical and exam elements from today's encounter can be found in the full encounter summary report (not reduplicated in this progress note). I personally obtained the chief complaint(s) and history of present illness. I confirmed and edited as necessary the review of systems, past medical/surgical history, family history, social history, and examination findings as documented by others; and I examined the patient myself. I personally reviewed the relevant tests, images, and reports as documented above. I formulated and edited as necessary the assessment and plan and discussed the findings and management plan with the patient. Allyson Saavedra MD      Today with Rene Gilbert I reviewed the indications, risks, benefits, and alternatives of the proposed surgical procedure including, but not limited to, failure obtain the desired result  and need for additional surgery, bleeding, infection, loss of vision, loss of the eye, and the remote possibility of permanent damage to any organ system or death with the use of anesthesia.  I provided multiple opportunities for the questions, answered all questions to the best of my ability, and confirmed that my answers and my discussion were understood.

## 2022-11-02 NOTE — NURSING NOTE
Chief Complaints and History of Present Illnesses   Patient presents with     Droopy Eye Lid Evaluation       Chief Complaint(s) and History of Present Illness(es)     Droopy Eye Lid Evaluation            Laterality: right upper lid and left upper lid          Comments    Patient here for consultation for droopy lids. Pt states she [place] about [place] and is not happy with the results. Feels skin/lids are still drooping, left worse than right.                  Elvira Turner, COA

## 2023-01-11 RX ORDER — LETROZOLE 2.5 MG/1
1 TABLET, FILM COATED ORAL
COMMUNITY
Start: 2022-12-05

## 2023-01-13 ENCOUNTER — ANESTHESIA EVENT (OUTPATIENT)
Dept: SURGERY | Facility: AMBULATORY SURGERY CENTER | Age: 43
End: 2023-01-13
Payer: COMMERCIAL

## 2023-01-14 ENCOUNTER — HEALTH MAINTENANCE LETTER (OUTPATIENT)
Age: 43
End: 2023-01-14

## 2023-01-16 ENCOUNTER — TELEPHONE (OUTPATIENT)
Dept: OPHTHALMOLOGY | Facility: CLINIC | Age: 43
End: 2023-01-16

## 2023-01-16 ENCOUNTER — HOSPITAL ENCOUNTER (OUTPATIENT)
Facility: AMBULATORY SURGERY CENTER | Age: 43
Discharge: HOME | End: 2023-01-16
Attending: OPHTHALMOLOGY | Admitting: OPHTHALMOLOGY
Payer: COMMERCIAL

## 2023-01-16 ENCOUNTER — NURSE TRIAGE (OUTPATIENT)
Dept: NURSING | Facility: CLINIC | Age: 43
End: 2023-01-16

## 2023-01-16 ENCOUNTER — ANESTHESIA (OUTPATIENT)
Dept: SURGERY | Facility: AMBULATORY SURGERY CENTER | Age: 43
End: 2023-01-16
Payer: COMMERCIAL

## 2023-01-16 VITALS
BODY MASS INDEX: 27.9 KG/M2 | HEART RATE: 68 BPM | DIASTOLIC BLOOD PRESSURE: 62 MMHG | RESPIRATION RATE: 16 BRPM | TEMPERATURE: 98.7 F | OXYGEN SATURATION: 100 % | SYSTOLIC BLOOD PRESSURE: 125 MMHG | WEIGHT: 154.98 LBS

## 2023-01-16 DIAGNOSIS — H02.831 DERMATOCHALASIS OF BOTH UPPER EYELIDS: ICD-10-CM

## 2023-01-16 DIAGNOSIS — H04.561 PUNCTAL STENOSIS, ACQUIRED, RIGHT: ICD-10-CM

## 2023-01-16 DIAGNOSIS — H02.834 DERMATOCHALASIS OF BOTH UPPER EYELIDS: ICD-10-CM

## 2023-01-16 DIAGNOSIS — H11.9 CONJUNCTIVAL LESION: ICD-10-CM

## 2023-01-16 DIAGNOSIS — H57.811 BROW PTOSIS, RIGHT: ICD-10-CM

## 2023-01-16 LAB — HCG UR QL: NEGATIVE

## 2023-01-16 PROCEDURE — 15823 BLEPHARP UPR EYELID XCSV SKN: CPT | Mod: 50

## 2023-01-16 PROCEDURE — 67840 REMOVE EYELID LESION: CPT | Mod: E3

## 2023-01-16 PROCEDURE — 15823 BLEPHARP UPR EYELID XCSV SKN: CPT | Mod: 50 | Performed by: OPHTHALMOLOGY

## 2023-01-16 PROCEDURE — 88304 TISSUE EXAM BY PATHOLOGIST: CPT | Performed by: OPHTHALMOLOGY

## 2023-01-16 PROCEDURE — 68110 EXC LES CONJUNCTIVA <1 CM: CPT | Mod: RT | Performed by: OPHTHALMOLOGY

## 2023-01-16 PROCEDURE — 68815 PROBE NASOLACRIMAL DUCT: CPT | Mod: RT

## 2023-01-16 PROCEDURE — 68815 PROBE NASOLACRIMAL DUCT: CPT | Mod: RT | Performed by: OPHTHALMOLOGY

## 2023-01-16 PROCEDURE — G8907 PT DOC NO EVENTS ON DISCHARG: HCPCS

## 2023-01-16 PROCEDURE — G8918 PT W/O PREOP ORDER IV AB PRO: HCPCS

## 2023-01-16 PROCEDURE — 81025 URINE PREGNANCY TEST: CPT | Performed by: ANESTHESIOLOGY

## 2023-01-16 DEVICE — EYE IMP KIT LACRIMAL INTUBATION MINI MONOKA S1.1500: Type: IMPLANTABLE DEVICE | Site: EYE | Status: FUNCTIONAL

## 2023-01-16 RX ORDER — MEPERIDINE HYDROCHLORIDE 25 MG/ML
12.5 INJECTION INTRAMUSCULAR; INTRAVENOUS; SUBCUTANEOUS
Status: DISCONTINUED | OUTPATIENT
Start: 2023-01-16 | End: 2023-01-17 | Stop reason: HOSPADM

## 2023-01-16 RX ORDER — ACETAMINOPHEN 325 MG/1
325-650 TABLET ORAL EVERY 6 HOURS PRN
COMMUNITY

## 2023-01-16 RX ORDER — PROPOFOL 10 MG/ML
INJECTION, EMULSION INTRAVENOUS CONTINUOUS PRN
Status: DISCONTINUED | OUTPATIENT
Start: 2023-01-16 | End: 2023-01-16

## 2023-01-16 RX ORDER — ERYTHROMYCIN 5 MG/G
OINTMENT OPHTHALMIC PRN
Status: DISCONTINUED | OUTPATIENT
Start: 2023-01-16 | End: 2023-01-16 | Stop reason: HOSPADM

## 2023-01-16 RX ORDER — SODIUM CHLORIDE, SODIUM LACTATE, POTASSIUM CHLORIDE, CALCIUM CHLORIDE 600; 310; 30; 20 MG/100ML; MG/100ML; MG/100ML; MG/100ML
INJECTION, SOLUTION INTRAVENOUS CONTINUOUS
Status: DISCONTINUED | OUTPATIENT
Start: 2023-01-16 | End: 2023-01-17 | Stop reason: HOSPADM

## 2023-01-16 RX ORDER — LIDOCAINE HYDROCHLORIDE 20 MG/ML
INJECTION, SOLUTION INFILTRATION; PERINEURAL PRN
Status: DISCONTINUED | OUTPATIENT
Start: 2023-01-16 | End: 2023-01-16

## 2023-01-16 RX ORDER — LIDOCAINE 40 MG/G
CREAM TOPICAL
Status: DISCONTINUED | OUTPATIENT
Start: 2023-01-16 | End: 2023-01-17 | Stop reason: HOSPADM

## 2023-01-16 RX ORDER — TETRACAINE HYDROCHLORIDE 5 MG/ML
SOLUTION OPHTHALMIC PRN
Status: DISCONTINUED | OUTPATIENT
Start: 2023-01-16 | End: 2023-01-16 | Stop reason: HOSPADM

## 2023-01-16 RX ORDER — FENTANYL CITRATE 50 UG/ML
25 INJECTION, SOLUTION INTRAMUSCULAR; INTRAVENOUS
Status: DISCONTINUED | OUTPATIENT
Start: 2023-01-16 | End: 2023-01-17 | Stop reason: HOSPADM

## 2023-01-16 RX ORDER — ACETAMINOPHEN 325 MG/1
975 TABLET ORAL ONCE
Status: DISCONTINUED | OUTPATIENT
Start: 2023-01-16 | End: 2023-01-17 | Stop reason: HOSPADM

## 2023-01-16 RX ORDER — LORAZEPAM 2 MG/ML
.5-1 INJECTION INTRAMUSCULAR
Status: DISCONTINUED | OUTPATIENT
Start: 2023-01-16 | End: 2023-01-17 | Stop reason: HOSPADM

## 2023-01-16 RX ORDER — ERYTHROMYCIN 5 MG/G
OINTMENT OPHTHALMIC
Qty: 3.5 G | Refills: 0 | Status: SHIPPED | OUTPATIENT
Start: 2023-01-16 | End: 2023-01-17

## 2023-01-16 RX ORDER — FENTANYL CITRATE 50 UG/ML
25 INJECTION, SOLUTION INTRAMUSCULAR; INTRAVENOUS EVERY 5 MIN PRN
Status: DISCONTINUED | OUTPATIENT
Start: 2023-01-16 | End: 2023-01-17 | Stop reason: HOSPADM

## 2023-01-16 RX ORDER — ONDANSETRON 2 MG/ML
4 INJECTION INTRAMUSCULAR; INTRAVENOUS EVERY 30 MIN PRN
Status: DISCONTINUED | OUTPATIENT
Start: 2023-01-16 | End: 2023-01-17 | Stop reason: HOSPADM

## 2023-01-16 RX ORDER — FENTANYL CITRATE 0.05 MG/ML
INJECTION, SOLUTION INTRAMUSCULAR; INTRAVENOUS PRN
Status: DISCONTINUED | OUTPATIENT
Start: 2023-01-16 | End: 2023-01-16

## 2023-01-16 RX ORDER — OXYCODONE HYDROCHLORIDE 5 MG/1
5 TABLET ORAL EVERY 4 HOURS PRN
Status: DISCONTINUED | OUTPATIENT
Start: 2023-01-16 | End: 2023-01-17 | Stop reason: HOSPADM

## 2023-01-16 RX ORDER — PROPOFOL 10 MG/ML
INJECTION, EMULSION INTRAVENOUS PRN
Status: DISCONTINUED | OUTPATIENT
Start: 2023-01-16 | End: 2023-01-16

## 2023-01-16 RX ORDER — ERYTHROMYCIN 5 MG/G
OINTMENT OPHTHALMIC
Qty: 3.5 G | Refills: 0 | Status: SHIPPED | OUTPATIENT
Start: 2023-01-16 | End: 2023-01-16

## 2023-01-16 RX ORDER — OXYCODONE HYDROCHLORIDE 5 MG/1
10 TABLET ORAL EVERY 4 HOURS PRN
Status: DISCONTINUED | OUTPATIENT
Start: 2023-01-16 | End: 2023-01-17 | Stop reason: HOSPADM

## 2023-01-16 RX ORDER — FENTANYL CITRATE 50 UG/ML
50 INJECTION, SOLUTION INTRAMUSCULAR; INTRAVENOUS EVERY 5 MIN PRN
Status: DISCONTINUED | OUTPATIENT
Start: 2023-01-16 | End: 2023-01-17 | Stop reason: HOSPADM

## 2023-01-16 RX ORDER — HYDRALAZINE HYDROCHLORIDE 20 MG/ML
2.5-5 INJECTION INTRAMUSCULAR; INTRAVENOUS EVERY 10 MIN PRN
Status: DISCONTINUED | OUTPATIENT
Start: 2023-01-16 | End: 2023-01-17 | Stop reason: HOSPADM

## 2023-01-16 RX ORDER — ALBUTEROL SULFATE 0.83 MG/ML
2.5 SOLUTION RESPIRATORY (INHALATION) EVERY 4 HOURS PRN
Status: DISCONTINUED | OUTPATIENT
Start: 2023-01-16 | End: 2023-01-17 | Stop reason: HOSPADM

## 2023-01-16 RX ORDER — ONDANSETRON 4 MG/1
4 TABLET, ORALLY DISINTEGRATING ORAL EVERY 30 MIN PRN
Status: DISCONTINUED | OUTPATIENT
Start: 2023-01-16 | End: 2023-01-17 | Stop reason: HOSPADM

## 2023-01-16 RX ADMIN — PROPOFOL 120 MG: 10 INJECTION, EMULSION INTRAVENOUS at 10:48

## 2023-01-16 RX ADMIN — LIDOCAINE HYDROCHLORIDE 60 MG: 20 INJECTION, SOLUTION INFILTRATION; PERINEURAL at 10:48

## 2023-01-16 RX ADMIN — FENTANYL CITRATE 25 MCG: 0.05 INJECTION, SOLUTION INTRAMUSCULAR; INTRAVENOUS at 10:43

## 2023-01-16 RX ADMIN — SODIUM CHLORIDE, SODIUM LACTATE, POTASSIUM CHLORIDE, CALCIUM CHLORIDE: 600; 310; 30; 20 INJECTION, SOLUTION INTRAVENOUS at 10:37

## 2023-01-16 RX ADMIN — PROPOFOL 50 MCG/KG/MIN: 10 INJECTION, EMULSION INTRAVENOUS at 10:52

## 2023-01-16 NOTE — ANESTHESIA PREPROCEDURE EVALUATION
Anesthesia Pre-Procedure Evaluation    Patient: Rene Gilbert   MRN: [id number] : 1980        Procedure : Procedure(s):  Both upper eyelid blepharoplasty  excision of right upper eyelid margin conjunctival lesion, and placement of mini Monoka stent          Past Medical History:   Diagnosis Date     Dyspareunia      Endometriosis, site unspecified      Infertility     Femara (letrazol)     Pelvic pain      PONV (postoperative nausea and vomiting)      Vaginitis and vulvovaginitis       Past Surgical History:   Procedure Laterality Date     ENT SURGERY      tonsillectomy     LAPAROSCOPIC ABLATION ENDOMETRIOSIS  2013    Procedure: LAPAROSCOPIC ABLATION ENDOMETRIOSIS;;  Surgeon: Viki Mills MD;  Location: [facility]     LAPAROSCOPIC ABLATION ENDOMETRIOSIS  2014    Procedure: LAPAROSCOPIC ABLATION ENDOMETRIOSIS;  Surgeon: Viki Mills MD;  Location: [facility]     LAPAROSCOPIC CYSTECTOMY OVARIAN (BENIGN)  2013    Procedure: LAPAROSCOPIC CYSTECTOMY OVARIAN (BENIGN);;  Surgeon: Viki Mills MD;  Location: [facility]     LAPAROSCOPIC TUBAL DYE STUDY  2014    Procedure: LAPAROSCOPIC TUBAL DYE STUDY;  Surgeon: Viki Mills MD;  Location: [facility]     LAPAROSCOPY DIAGNOSTIC (GYN)  2013    Procedure: LAPAROSCOPY DIAGNOSTIC (GYN);  laparoscopy pelviscopy right laparoscopic right ovarian cyst , bilateral transection of uteral sacral ligament. cautery of endometriosis, placement of interceed;  Surgeon: Viki Mills MD;  Location: [facility]     LAPAROSCOPY DIAGNOSTIC (GYN) N/A 2014    Procedure: LAPAROSCOPY DIAGNOSTIC (GYN);  Surgeon: Viki Mills MD;  Location: [facility]     REPAIR PTOSIS  12/15/2020      Allergies   Allergen Reactions     Seasonal Allergies       Social History     Tobacco Use     Smoking status: Never     Smokeless tobacco: Never   Substance Use Topics     Alcohol use: No      Wt Readings from Last 1  Encounters:   01/13/23 70.3 kg (154 lb 15.7 oz)        Anesthesia Evaluation   Pt has had prior anesthetic.     History of anesthetic complications  - PONV.      ROS/MED HX  ENT/Pulmonary:       Neurologic:       Cardiovascular:       METS/Exercise Tolerance:     Hematologic:       Musculoskeletal:       GI/Hepatic:       Renal/Genitourinary:       Endo:       Psychiatric/Substance Use:       Infectious Disease:       Malignancy:       Other:            Physical Exam    Airway        Mallampati: II       Respiratory Devices and Support         Dental           Cardiovascular          Rhythm and rate: regular     Pulmonary           breath sounds clear to auscultation           OUTSIDE LABS:  CBC:   Lab Results   Component Value Date    WBC 8.2 11/17/2018    WBC 7.1 06/27/2018    HGB 12.6 11/17/2018    HGB 12.3 06/27/2018    HCT 39.1 11/17/2018    HCT 38.2 06/27/2018     11/17/2018     06/27/2018     BMP:   Lab Results   Component Value Date     06/27/2018     11/25/2014    POTASSIUM 3.7 06/27/2018    POTASSIUM 3.4 11/25/2014    CHLORIDE 106 06/27/2018    CHLORIDE 102 11/25/2014    CO2 27 06/27/2018    CO2 31 11/25/2014    BUN 10 06/27/2018    BUN 10 11/25/2014    CR 0.60 06/27/2018    CR 0.50 (L) 11/25/2014    GLC 88 06/27/2018    GLC 83 06/28/2017     COAGS: No results found for: PTT, INR, FIBR  POC:   Lab Results   Component Value Date    HCG Negative 11/28/2014    HCGS Negative 02/17/2015     HEPATIC: No results found for: ALBUMIN, PROTTOTAL, ALT, AST, GGT, ALKPHOS, BILITOTAL, BILIDIRECT, BRANDON  OTHER:   Lab Results   Component Value Date    A1C 4.9 06/27/2018    JOHN 8.5 06/27/2018    TSH 1.04 06/30/2020    T4 0.96 02/17/2015       Anesthesia Plan    ASA Status:  2   NPO Status:  NPO Appropriate    Anesthesia Type: MAC.     - Reason for MAC: straight local not clinically adequate              Consents    Anesthesia Plan(s) and associated risks, benefits, and realistic alternatives  discussed. Questions answered and patient/representative(s) expressed understanding.    - Discussed:     - Discussed with:  Patient         Postoperative Care            Comments:                Tejas Purvis MD

## 2023-01-16 NOTE — ANESTHESIA POSTPROCEDURE EVALUATION
Patient: Rene Gilbert    Procedure: Procedure(s):  Both upper eyelid blepharoplasty  excision of right upper eyelid margin conjunctival lesion, and placement of mini Monoka stent       Anesthesia Type:  MAC    Note:  Disposition: Outpatient   Postop Pain Control: Uneventful            Sign Out: Well controlled pain   PONV: No   Neuro/Psych: Uneventful            Sign Out: Acceptable/Baseline neuro status   Airway/Respiratory: Uneventful            Sign Out: Acceptable/Baseline resp. status   CV/Hemodynamics: Uneventful            Sign Out: Acceptable CV status; No obvious hypovolemia; No obvious fluid overload   Other NRE: NONE   DID A NON-ROUTINE EVENT OCCUR?            Last vitals:  Vitals Value Taken Time   /62 01/16/23 1213   Temp 98.7  F (37.1  C) 01/16/23 1135   Pulse 68 01/16/23 1135   Resp 16 01/16/23 1213   SpO2 100 % 01/16/23 1213       Electronically Signed By: Tejas Purvis MD  January 16, 2023  2:07 PM

## 2023-01-16 NOTE — OP NOTE
Procedure Date: 01/16/2023    PREOPERATIVE DIAGNOSES:    1.  Both upper eyelid dermatochalasis.  2.  Right upper eyelid margin lesion involving the conjunctiva.  3.  Right upper eyelid punctal stenosis.    POSTOPERATIVE DIAGNOSES:    1.  Both upper eyelid dermatochalasis.  2.  Right upper eyelid margin lesion involving the conjunctiva.  3.  Right upper eyelid punctal stenosis.    PROCEDURE PERFORMED:    1.  Both upper eyelid blepharoplasty.  2.  Excisional biopsy of right upper eyelid margin lesion.  3.  Silicone intubation of the right upper eyelid and system.    SURGEON:  Allyson Saavedra MD    ASSISTANT:  Margy Whitehead MD; Rafita Wells MD    ANESTHESIA:  Monitored anesthesia care with local infiltration of 50:50 mixture of 2% lidocaine with epinephrine and 0.5% Marcaine.    COMPLICATIONS:  None.    ESTIMATED BLOOD LOSS:  2 mL    SPECIMENS:  Right upper eyelid lesion.    INDICATIONS FOR PROCEDURE:  Rene Gilbert presented with heavy upper eyelid dermatochalasis obscuring her peripheral vision.  She had prior surgery in [place].  Additionally, she had an enlarging lesion of the upper eyelid along the margin nasal to the upper punctum.  She had a stenotic upper punctum.  We discussed risks, benefits and alternatives of the proposed procedure and she elected to proceed.    DESCRIPTION OF PROCEDURE:  Rene Gilbert was brought to the operating room and placed supine on the operating table.  Under monitored anesthesia care, both upper eyelid creases and excess skin were marked out.  We incorporated her prior upper eyelid incisions.  The upper eyelids were infiltrated with local anesthetic as above and she was prepped and draped in typical sterile fashion for oculoplastic surgery.      Attention was directed to the right side.  First, attention was directed to the area of punctal stenosis.  Her upper punctum was not patent.  I use a 25-gauge needle to enter the punctum, then a punctal dilator allowed me to enter  the vertical aspect of the canaliculus and then the horizontal aspect.  The punctum was dilated and a Mini Monoka monocanalicular silicone stent was threaded through the upper punctum into the canalicular system into the nasolacrimal sac and duct.  The footplate of the stent was seated into the upper punctum.  Attention was then directed to the lesion, which was elevated off of the upper eyelid margin and excised.  While it did not enter the canalicular system, it was quite thin and, again, the silicone stent was placed to help prevent stenosis of the canalicular system.  Attention was then directed to the upper eyelid blepharoplasty on the right side.  A 15 blade was used to incise skin and skin flap was excised with thermal cautery.  Nasally, the orbital septum was opened on the right side where she had moderate hollowing and the nasal fat pad was mobilized and then engaged with a 5-0 chromic suture and secured to the orbicularis oculi superiorly to transpose it anteriorly.  Laterally, the orbicularis oculi muscle was opened and a preseptal plane was developed going over the superolateral orbital rim.  A 4-0 Monocryl suture was passed through the superior lateral orbital rim periosteum about 1 cm up above the orbital rim and then this was secured to the deep orbicularis oculi and dermis about a centimeter above the upper eyelid incision.  This was tied down, supporting the tail of the brow and the skin was closed with a running 6-0 nylon suture.      Attention was directed to the left side, where skin was incised with a 15 blade and skin flap was excised with thermal cautery.  Hemostasis was assured.  Laterally, the orbicularis was opened and dissection was carried in a superolateral trajectory in a preseptal plane going over the orbital rim.  Again, a 5-0 Monocryl suture was passed about 1 cm above the superolateral orbital rim and then through the deep tissue about 1 cm above the superior skin incision and this  was again tied down and the skin was closed with running 6-0 nylon suture.      She tolerated the procedure well.  Erythromycin ophthalmic ointment was applied, and she left the operating room in stable condition.    Allyson Saavedra MD        D: 2023   T: 2023   MT: HUNG    Name:     ADRIANNA CLARKE  MRN:      [id number]        Account:        [id number]   :      1980           Procedure Date: 2023     Document: [id number]

## 2023-01-16 NOTE — TELEPHONE ENCOUNTER
[facility] Call Center    Phone Message    May a detailed message be left on voicemail: yes     Reason for Call: Other: Susi from [facility] would like a call back from Dr Saavedra or care team in regards to patient being seen today for surgery and medication. Please call Susi back at [phone]. Thank you.     Action Taken: Message routed to:  [facility] ([facility] Eye    Travel Screening: Not Applicable

## 2023-01-16 NOTE — BRIEF OP NOTE
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 Sattar, Mohammad, M.D.  Anesthesia: MAC with Local   Estimated Blood Loss: Minimal    Drains: None  Specimens:   ID Type Source Tests Collected by Time Destination   1 : right upper eyelid punctum  Tissue Eyelid, Upper, Right SURGICAL PATHOLOGY EXAM Allyson Saavedra MD 1/16/2023 11:02 AM      Findings:   as expected.  Complications: None.  Implants:   Implant Name Type Inv. Item Serial No.  Lot No. LRB No. Used Action   EYE IMP KIT LACRIMAL INTUBATION MINI MONOKA S1.1500U - [id number] Lens/Eye Implant EYE IMP KIT LACRIMAL INTUBATION MINI MONOKA S1.1500U [id number] [facility] OPHTHALMICS [id number] Right 1 Implanted

## 2023-01-16 NOTE — ANESTHESIA CARE TRANSFER NOTE
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    Anesthesia Type:   MAC     Note:    Oropharynx: oropharynx clear of all foreign objects  Level of Consciousness: awake  Oxygen Supplementation: room air    Independent Airway: airway patency satisfactory and stable  Dentition: dentition unchanged  Vital Signs Stable: post-procedure vital signs reviewed and stable  Report to RN Given: handoff report given  Patient transferred to: Phase II  Comments: To Phase II. Report to RN.  VSS Resp status stable.  Handoff Report: Identifed the Patient, Identified the Reponsible Provider, Reviewed the pertinent medical history, Discussed the surgical course, Reviewed Intra-OP anesthesia mangement and issues during anesthesia, Set expectations for post-procedure period and Allowed opportunity for questions and acknowledgement of understanding      Vitals:  Vitals Value Taken Time   BP     Temp     Pulse     Resp     SpO2         Electronically Signed By: JOSE MIGUEL Macias CRNA  January 16, 2023  11:34 AM

## 2023-01-16 NOTE — DISCHARGE INSTRUCTIONS
Post-operative Instructions  Ophthalmic Plastic and Reconstructive Surgery    Allyson Saavedra M.D.     All instructions apply to the operated eye(s) or eyelid(s).    Wound care and personal care  ? Apply ice compresses 15 minutes of every hour while awake for 2 days. If you are sleeping, you don't need to wake up to ice. As long as there is no further bleeding, after two days, switch to warm water compresses for five minutes, four times a day until seen by your physician.   ? You may shower or wash your hair the day after surgery. Do not go swimming for at least 2 weeks to prevent contamination of your wounds.  ? You may go for walks and light activity is ok, but no heavy (over 15 pounds) lifting, bending or excessive straining for one week.   ? Do not apply make-up to the eyes or eyelids for 2 weeks after surgery.  ? Expect some swelling, bruising, black eye (even into the lower eyelids and cheeks). Also expect serum caking, crusting and discharge from the eye and/or incisions. A small amount of surface bleeding, and depending on the type of surgery, bleeding from the inside of the eyelid, is normal for the first 48 hours.  ? Avoid straining, bending at the waist, or lifting more than 15 pounds for 1 week. Sleeping with your head elevated, such as in a recliner, for the first several days can help swelling resolve more quickly.   ? Do continue to ambulate (walk) as you normally would - being sedentary after surgery can cause blood clots.   ? Your eye(s) and eyelid(s) may be painful and tender. This is normal after surgery.      Contact information and follow-up  ? Return to the Eye Clinic for a follow-up appointment with your physician as scheduled. If no appointment has been scheduled:   - [facility] eye clinic: [phone] for an appointment with Dr. Saavedra within 2 to 3 weeks from your date of surgery.      -  [facility] eye clinic: [phone] for an appointment with   Quentin within 2 to 3 weeks from your date of surgery.     ? For severe pain, bleeding, or loss of vision, call the [facility] at [phone] or [facility] at [phone].     After hours or on weekends and holidays, call [phone] and ask to speak with the ophthalmologist on call.    An on call person can be reached after hours for concerns. The on call doctor should not call in medication refill requests after hours or on weekends, so please plan accordingly. An effort has been made to provide adequate pain medications following every surgery, and refills will not be provided in most instances.     Medications  ? Restart all regular home medications and eye drops. If you take Plavix or Aspirin on a regular basis, wait for 72 hours after your surgery before restarting these in order to decrease the risk of bleeding complications.  ? Avoid aspirin and aspirin-like medications (Motrin, Aleve, Ibuprofen, [place] etc) for 72 hours to reduce the risk of bleeding. You may take Tylenol (acetaminophen) for pain.  ? In addition to your home medications, take the following post-operative medications as prescribed by your physician.    ? Apply antibiotic ointment to all sutures three times a day, and into the operated eye(s) at night.   Once you run out, you can apply Vaseline or Aquaphor (over the counter) to the incisions. Don't put the Vaseline or Aquaphor into your eyes.   ? If you have ocular irritation, you can use over the counter artificial tears such as Refresh, Systane, or Blink. Do not use Visine, Clear Eyes, or any other drop that gets the red out.   ? In many cases, postoperative discomfort can be managed with Tylenol alone. If narcotic pain medication was prescribed, take pain pills at prescribed frequency as needed for pain.    ? WARNING: Some pain medications contain acetaminophen. You must not take more than 4,000 mg of acetaminophen per 24-hour period.  This is equivalent to 12 tablets of Norco, Percocet or Tylenol #3. If you take other over-the-counter medications containing acetaminophen, you must take the amount of acetaminophen into account and reduce the number of prescribed pain pills accordingly.    [facility]   Adult Discharge Orders & Instructions     For 24 hours after surgery    Get plenty of rest.  A responsible adult must stay with you for at least 24 hours after you leave the hospital.   Do not drive or use heavy equipment.  If you have weakness or tingling, don't drive or use heavy equipment until this feeling goes away.  Do not drink alcohol.  Avoid strenuous or risky activities.  Ask for help when climbing stairs.   You may feel lightheaded.  IF so, sit for a few minutes before standing.  Have someone help you get up.   If you have nausea (feel sick to your stomach): Drink only clear liquids such as apple juice, ginger ale, broth or 7-Up.  Rest may also help.  Be sure to drink enough fluids.  Move to a regular diet as you feel able.  You may have a slight fever. Call the doctor if your fever is over 100 F (37.7 C) (taken under the tongue) or lasts longer than 24 hours.  You may have a dry mouth, a sore throat, muscle aches or trouble sleeping.  These should go away after 24 hours.  Do not make important or legal decisions.     Call your doctor for any of the followin.  Signs of infection (fever, growing tenderness at the surgery site, a large amount of drainage or bleeding, severe pain, foul-smelling drainage, redness, swelling).    2. It has been over 8 to 10 hours since surgery and you are still not able to urinate (pass water).    3.  Headache for over 24 hours.    4.  Numbness, tingling or weakness the day after surgery (if you had spinal anesthesia).                  5. Signs of Covid-19 infection (temperature over 100 degrees, shortness of breath, cough, loss of taste/smell, generalized body aches, persistent headache,                   chills, sore throat, nausea/vomiting/diarrhea).

## 2023-01-17 DIAGNOSIS — H02.831 DERMATOCHALASIS OF BOTH UPPER EYELIDS: ICD-10-CM

## 2023-01-17 DIAGNOSIS — H02.834 DERMATOCHALASIS OF BOTH UPPER EYELIDS: ICD-10-CM

## 2023-01-17 RX ORDER — ERYTHROMYCIN 5 MG/G
OINTMENT OPHTHALMIC
Qty: 3.5 G | Refills: 0 | Status: SHIPPED | OUTPATIENT
Start: 2023-01-17

## 2023-01-17 NOTE — TELEPHONE ENCOUNTER
Caller states  RX for eye ointment  Post op nott requested pharmacy  Review of medications reveals it was sent to a  pharmacy that is closed   Order e escribed per protocol to requested pharmcy and received   Patient advised to call pharmacy first to make sure instock and  Call back if problem   Will comply   Rosa Murcia RN  FNA      1/16/2023 - present  [facility]   Allyson Saavedra MD  Last attending   Treatment team       Medication Changes       Erythromycin 5 MG/GM Apply small amount to incision sites three times daily, then apply to inner lower lid of operative eye(s) at bedtime, as directed.    Reason for Disposition    [1] Prescription prescribed recently is not at pharmacy AND [2] triager has access to patient's EMR AND [3] prescription is recorded in the EMR    Protocols used: MEDICATION REFILL AND RENEWAL CALL-A-       Marcus [phone]     Rosalina Zhu  08/09/17 [id number]

## 2023-01-20 LAB
PATH REPORT.COMMENTS IMP SPEC: NORMAL
PATH REPORT.COMMENTS IMP SPEC: NORMAL
PATH REPORT.FINAL DX SPEC: NORMAL
PATH REPORT.GROSS SPEC: NORMAL
PATH REPORT.MICROSCOPIC SPEC OTHER STN: NORMAL
PATH REPORT.RELEVANT HX SPEC: NORMAL
PHOTO IMAGE: NORMAL

## 2023-01-25 ENCOUNTER — OFFICE VISIT (OUTPATIENT)
Dept: OPHTHALMOLOGY | Facility: CLINIC | Age: 43
End: 2023-01-25
Payer: COMMERCIAL

## 2023-01-25 DIAGNOSIS — H02.831 DERMATOCHALASIS OF BOTH UPPER EYELIDS: Primary | ICD-10-CM

## 2023-01-25 DIAGNOSIS — H02.834 DERMATOCHALASIS OF BOTH UPPER EYELIDS: Primary | ICD-10-CM

## 2023-01-25 PROCEDURE — 99024 POSTOP FOLLOW-UP VISIT: CPT | Performed by: OPHTHALMOLOGY

## 2023-01-25 ASSESSMENT — VISUAL ACUITY
OD_SC: 20/20
OD_SC+: -1
OS_SC: 20/20
METHOD: SNELLEN - LINEAR

## 2023-01-25 ASSESSMENT — CONF VISUAL FIELD
OD_SUPERIOR_NASAL_RESTRICTION: 0
OS_NORMAL: 1
OD_INFERIOR_TEMPORAL_RESTRICTION: 0
OD_INFERIOR_NASAL_RESTRICTION: 0
OS_SUPERIOR_NASAL_RESTRICTION: 0
OS_SUPERIOR_TEMPORAL_RESTRICTION: 0
OD_NORMAL: 1
OD_SUPERIOR_TEMPORAL_RESTRICTION: 0
OS_INFERIOR_TEMPORAL_RESTRICTION: 0
METHOD: COUNTING FINGERS
OS_INFERIOR_NASAL_RESTRICTION: 0

## 2023-01-25 ASSESSMENT — TONOMETRY
IOP_METHOD: ICARE
OD_IOP_MMHG: 18
OS_IOP_MMHG: 13

## 2023-01-25 NOTE — PATIENT INSTRUCTIONS
"- Apply warm compresses for 1 minute three times a day until your bruising has resolved.  - Cool compresses can help with swelling and itching, you can alternate cool compresses with warm compresses if you find it helpful.  - Apply Aquaphor or Vaseline to the incision at bedtime.   - If you have symptoms of eye irritation, it is good to use over the counter artificial tears. Good brands include Refresh, Blink, and Systane. Do NOT get drops that are for \"red eyes.\"   - It is normal for the incision to appear raised, red, itch and have small lumps. You can gently massage any small bumps along the incision line. These can take up to six months to resolve.    "

## 2023-01-25 NOTE — NURSING NOTE
No chief complaint on file.      Chief Complaint(s) and History of Present Illness(es)    S/P 1.  Both upper eyelid blepharoplasty, 2.  Excisional biopsy of right upper eyelid margin lesion, 3.  Silicone intubation of the right upper eyelid and system, 01/16/23.   Patient healing well, minimal bruising and swelling. Most of that cleared up after 3 days. Continues to use EES edwige. States she can feel the stent and its slightly irritating.          Reg Maldonado, Ophthalmic Assistant

## 2023-01-25 NOTE — PROGRESS NOTES
"Chief Complaint(s) and History of Present Illness(es)    S/P 1.  Both upper eyelid blepharoplasty, 2.  Excisional biopsy of right   upper eyelid margin lesion, 3.  Silicone intubation of the right upper   eyelid and system, 01/16/23.   Patient healing well, minimal bruising and swelling. Most of that cleared   up after 3 days. Continues to use EES edwige. States she can feel the stent   and its slightly irritating.        Doing well. Happy with outcome. Sutures removed. Small lateral left eyelid cyst.     Patient Instructions   - Apply warm compresses for 1 minute three times a day until your bruising has resolved.  - Cool compresses can help with swelling and itching, you can alternate cool compresses with warm compresses if you find it helpful.  - Apply Aquaphor or Vaseline to the incision at bedtime.   - If you have symptoms of eye irritation, it is good to use over the counter artificial tears. Good brands include Refresh, Blink, and Systane. Do NOT get drops that are for \"red eyes.\"   - It is normal for the incision to appear raised, red, itch and have small lumps. You can gently massage any small bumps along the incision line. These can take up to six months to resolve.      Return in about 2 months (around 3/25/2023).      Attending Physician Attestation: Complete documentation of historical and exam elements from today's encounter can be found in the full encounter summary report (not reduplicated in this progress note). I personally obtained the chief complaint(s) and history of present illness. I confirmed and edited as necessary the review of systems, past medical/surgical history, family history, social history, and examination findings as documented by others; and I examined the patient myself. I personally reviewed the relevant tests, images, and reports as documented above. I formulated and edited as necessary the assessment and plan and discussed the findings and management plan with the patient and " family. I personally reviewed the ophthalmic test(s) associated with this encounter, agree with the interpretation(s) as documented by the resident/fellow, and have edited the corresponding report(s) as necessary. Allyson Saavedra MD

## 2023-03-01 ENCOUNTER — OFFICE VISIT (OUTPATIENT)
Dept: OPHTHALMOLOGY | Facility: CLINIC | Age: 43
End: 2023-03-01
Payer: COMMERCIAL

## 2023-03-01 DIAGNOSIS — H02.834 DERMATOCHALASIS OF BOTH UPPER EYELIDS: Primary | ICD-10-CM

## 2023-03-01 DIAGNOSIS — H57.811 BROW PTOSIS, RIGHT: ICD-10-CM

## 2023-03-01 DIAGNOSIS — H02.831 DERMATOCHALASIS OF BOTH UPPER EYELIDS: Primary | ICD-10-CM

## 2023-03-01 PROCEDURE — 99024 POSTOP FOLLOW-UP VISIT: CPT | Performed by: OPHTHALMOLOGY

## 2023-03-01 ASSESSMENT — CONF VISUAL FIELD
OS_INFERIOR_NASAL_RESTRICTION: 0
OD_INFERIOR_NASAL_RESTRICTION: 0
OD_NORMAL: 1
OS_INFERIOR_TEMPORAL_RESTRICTION: 0
OS_SUPERIOR_NASAL_RESTRICTION: 0
OS_NORMAL: 1
METHOD: COUNTING FINGERS
OD_SUPERIOR_NASAL_RESTRICTION: 0
OD_INFERIOR_TEMPORAL_RESTRICTION: 0
OS_SUPERIOR_TEMPORAL_RESTRICTION: 0
OD_SUPERIOR_TEMPORAL_RESTRICTION: 0

## 2023-03-01 ASSESSMENT — VISUAL ACUITY
METHOD: SNELLEN - LINEAR
OS_SC+: -1
OD_SC: 20/20
OS_SC: 20/20

## 2023-03-01 ASSESSMENT — TONOMETRY
IOP_METHOD: ICARE
OD_IOP_MMHG: 11
OS_IOP_MMHG: 11

## 2023-03-01 NOTE — PROGRESS NOTES
Chief Complaint(s) and History of Present Illness(es)     Post Op (Ophthalmology) Both Eyes            Laterality: both eyes          Comments    S/P 1.  Both upper eyelid blepharoplasty, 2.Excisional biopsy of right   upper eyelid margin lesion 3. Silicone intubation of the right upper   eyelid and system, 01/16/23.   Patient is happy with results. Using a scar edwige to help with the scaring.   Can still feel some small bumps along the incision line.           Rene Gilbert is about 2 months status post op.  She is doing well.  Small suture line cyst on lateral left. Massage and warm compresses. If fails to resolve can excise around Postoperative month 6.  She will follow up with me on an as needed basis.    Complete documentation of historical and exam elements from today's encounter can be found in the full encounter summary report (not reduplicated in this progress note). I personally obtained the chief complaint(s) and history of present illness.  I confirmed and edited as necessary the review of systems, past medical/surgical history, family history, social history, and examination findings as documented by others; and I examined the patient myself. I personally reviewed the relevant tests, images, and reports as documented above. I formulated and edited as necessary the assessment and plan and discussed the findings and management plan with the patient and family. - Allyson Saavedra MD

## 2023-03-01 NOTE — NURSING NOTE
Chief Complaints and History of Present Illnesses   Patient presents with     Post Op (Ophthalmology) Both Eyes       Chief Complaint(s) and History of Present Illness(es)     Post Op (Ophthalmology) Both Eyes            Laterality: both eyes          Comments    S/P 1.  Both upper eyelid blepharoplasty, 2.Excisional biopsy of right upper eyelid margin lesion 3. Silicone intubation of the right upper eyelid and system, 01/16/23.   Patient is happy with results. Using a scar edwige to help with the scaring. Can still feel some small bumps along the incision line.                  Reg Maldonado, Ophthalmic Assistant

## 2023-12-09 ENCOUNTER — HEALTH MAINTENANCE LETTER (OUTPATIENT)
Age: 43
End: 2023-12-09

## 2024-02-11 ENCOUNTER — HEALTH MAINTENANCE LETTER (OUTPATIENT)
Age: 44
End: 2024-02-11

## 2024-07-10 ENCOUNTER — OFFICE VISIT (OUTPATIENT)
Dept: OPHTHALMOLOGY | Facility: CLINIC | Age: 44
End: 2024-07-10
Payer: COMMERCIAL

## 2024-07-10 DIAGNOSIS — H02.831 DERMATOCHALASIS OF BOTH UPPER EYELIDS: Primary | ICD-10-CM

## 2024-07-10 DIAGNOSIS — H57.811 BROW PTOSIS, RIGHT: ICD-10-CM

## 2024-07-10 DIAGNOSIS — H02.9 EYELID LESION: ICD-10-CM

## 2024-07-10 DIAGNOSIS — H02.834 DERMATOCHALASIS OF BOTH UPPER EYELIDS: Primary | ICD-10-CM

## 2024-07-10 PROCEDURE — 99213 OFFICE O/P EST LOW 20 MIN: CPT | Performed by: OPHTHALMOLOGY

## 2024-07-10 ASSESSMENT — TONOMETRY
OS_IOP_MMHG: 18
IOP_METHOD: ICARE
OD_IOP_MMHG: 21

## 2024-07-10 ASSESSMENT — SLIT LAMP EXAM - LIDS: COMMENTS: NORMAL

## 2024-07-10 ASSESSMENT — VISUAL ACUITY
OD_SC: 20/20
OS_SC: 20/20
METHOD: SNELLEN - LINEAR

## 2024-07-10 ASSESSMENT — EXTERNAL EXAM - LEFT EYE: OS_EXAM: NORMAL

## 2024-07-10 ASSESSMENT — EXTERNAL EXAM - RIGHT EYE: OD_EXAM: NORMAL

## 2024-07-10 NOTE — PROGRESS NOTES
Chief Complaint(s) and History of Present Illness(es)     Follow Up            Laterality: both eyes    Course: stable    Associated symptoms: Negative for eye pain          Comments    Here for follow up. Still some scarring but overall very satisfied with   results. No pain.     Rachid Dumont COT 1:19 PM July 10, 2024       Assessment & Plan     Rene Gilbert is a 43 year old female with the following diagnoses:   Encounter Diagnoses   Name Primary?    Dermatochalasis of both upper eyelids Yes    Brow ptosis, right      Sp both upper eyelid blepharoplasty and excision of right upper eyelid margin lesion with stent of lacrimal system 1/2023    She has a persistent left lateral canthus dome shaped cyst, likely EIC. It has not resolved, and continues to irritate her. Can excise in clinic.    She also has a left lower eyelid margin lesion, appears to be an eyelid margin nevus. She notes it is enlarging. I reviewed photos from over one year ago. It was present but smaller.   Options:  1. Observation  Or if irritating to her eye can consider:  2. Excision of the lesion which would leave an area of madarosis (I would discourage this)  3. Pentagonal wedge. I discussed she would trade the area of the lesion for a vertical scar. She will think about how symptomatic she is of the lesion and let us k now how she would like to proceed.             Patient disposition:   Return for procedure appointment at her convenience .        Attending Physician Attestation: Complete documentation of historical and exam elements from today's encounter can be found in the full encounter summary report (not reduplicated in this progress note). I personally obtained the chief complaint(s) and history of present illness. I confirmed and edited as necessary the review of systems, past medical/surgical history, family history, social history, and examination findings as documented by others; and I examined the patient myself. I personally  reviewed the relevant tests, images, and reports as documented above. I formulated and edited as necessary the assessment and plan and discussed the findings and management plan with the patient.  -Allyson Saavedra MD

## 2024-09-04 ENCOUNTER — OFFICE VISIT (OUTPATIENT)
Dept: OPHTHALMOLOGY | Facility: CLINIC | Age: 44
End: 2024-09-04
Payer: COMMERCIAL

## 2024-09-04 DIAGNOSIS — H02.9 EYELID LESION: Primary | ICD-10-CM

## 2024-09-04 PROCEDURE — 88305 TISSUE EXAM BY PATHOLOGIST: CPT | Performed by: OPHTHALMOLOGY

## 2024-09-04 PROCEDURE — 67961 REVISION OF EYELID: CPT | Mod: E2 | Performed by: OPHTHALMOLOGY

## 2024-09-04 RX ORDER — ERYTHROMYCIN 5 MG/G
OINTMENT OPHTHALMIC ONCE
Status: COMPLETED | OUTPATIENT
Start: 2024-09-04 | End: 2024-09-04

## 2024-09-04 RX ADMIN — ERYTHROMYCIN: 5 OINTMENT OPHTHALMIC at 11:58

## 2024-09-04 NOTE — LETTER
"September 10, 2024      Rene Gilbert  [address] [place] [address]        Dear López,    Please see below for your test results. The lesion was benign (not worrisome). As long as you are healing well, no further care should be necessary for the area. You can continue to apply the prescribed ointment, or Vaseline or Aquaphor to the area until it has healed nicely. If you have any concerns or recurrence, please return for further evaluation.       Resulted Orders   Surgical Pathology Exam   Result Value Ref Range    Case Report       Surgical Pathology Report                         Case: [id number]                                  Authorizing Provider:  Allyson Saavedra MD      Collected:           09/04/2024 11:58 AM          Ordering Location:     [facility]   Received:            09/04/2024 04:10 PM                                 [place]                                                                  Pathologist:           Maya Franco MD                                                         Specimen:    Eyelid, Lower, Left                                                                        Final Diagnosis       Lower eyelid, left, wedge resection: Findings most consistent with sebaceous hyperplasia.       Clinical Information       The patient is a 43 year old female with an enlarging lesion of the left lower eyelid. She undergoes wedge resection of the lesion on the left.      Gross Description       A(A). Eyelid, Lower, Left, Eyelid, Lower, Left:  The specimen is received in formalin with proper patient identification, labeled \"eyelid, lower, left\".  The specimen consists of a 0.5 x 0.3 x 0.3 cm gray-tan to white, firm piece of skin and conjunctiva.  The surgical margin is inked black.  The specimen is submitted in toto as A1.       Microscopic Description       On initial and deeper levels, the tissue consists of eyelid margin conjunctiva and tarsus with " sebaceous Meibomian glands. The sebaceous gland lobules are enlarged, consisting of sebocytes with normal morphology and normal gland architecture.      Performing Labs       The technical component of this testing was completed at [facility].    Stain controls for all stains resulted within this report have been reviewed and show appropriate reactivity.       Case Images         If you have any questions, please call the clinic to make an appointment.    Sincerely,    Allyson Saavedra MD    Oculoplastic and Orbital Surgery   Department of Ophthalmology and Visual Neurosciences  [facility]

## 2024-09-04 NOTE — PROGRESS NOTES
PREOPERATIVE DIAGNOSIS: Left lower eyelid mass  POSTOPERATIVE DIAGNOSIS: Left lower eyelid mass  PROCEDURE: Wedge resection and reconstruction, left  lower eyelid (less than 25% of eyelid margin).   SURGEON: Allyson Saavedra MD  ASSISTANT: None  ANESTHESIA: 1% lidocaine with epinephrine.  COMPLICATIONS: None.   ESTIMATED BLOOD LOSS: Less than 5 cc.   SPECIMEN: Eyelid lesion in formalin  INDICATIONS: Rene Gilbert presented with a left  lower lid mass that was enlarging in size. 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 After her last visit she noticed the lesion is certainly increasing in size and irritating her eye. She desired removal. After the risks, benefits and alternatives to the proposed procedure were explained Informed Consent was obtained from the patient.   DESCRIPTION OF PROCEDURE: The patient was brought to the minor room and placed supine on the table. The left lower lid was infiltrated with local anesthetic. The patient was prepped and draped in the typical fashion. Attention was directed to the left side. The area of the mass was outlined in a pentagonal wedge. This was excised with a iris scissors. Hemostasis was obtained with a high-temperature cautery. The margin was reconstructed with an internal buried vertical mattress 6-0 Vicryl suture. Additional Vicryl sutures were placed at the lash line, and the tarsal plate was closed with partial thickness 6-0 Vicryl sutures. The skin was closed with interrupted 6-0 plain gut sutures. She also had a small suture line cyst along the lateral upper eyelid which was excised with a 15 blade and closed with a 6-0 plain gut suture. Erythromycin ophthalmic ointment was applied. The patient tolerated the procedure well and left the room in stable condition.   Allyson Saavedra MD

## 2025-03-08 ENCOUNTER — HEALTH MAINTENANCE LETTER (OUTPATIENT)
Age: 45
End: 2025-03-08

## (undated) DEVICE — SU CHROMIC 6-0 G-1 18" 790G

## (undated) DEVICE — MARKER SKIN DOUBLE TIP W/FLEXI-RULER W/LABELS

## (undated) DEVICE — NDL 30GA 0.5" 305106

## (undated) DEVICE — SU ETHILON 6-0 P-1 18" 697G

## (undated) DEVICE — NDL ECLIPSE 25GA 1.5"

## (undated) DEVICE — ESU EYE HIGH TEMP 65410-183

## (undated) DEVICE — PACK MINOR EYE

## (undated) DEVICE — SYR 03ML LL W/O NDL

## (undated) DEVICE — GLOVE PROTEXIS W/NEU-THERA 7.5  2D73TE75

## (undated) DEVICE — SOL WATER IRRIG 1000ML BOTTLE 07139-09

## (undated) DEVICE — SU MONOCRYL 4-0 P-3 18" UND Y494G

## (undated) RX ORDER — PROPOFOL 10 MG/ML
INJECTION, EMULSION INTRAVENOUS
Status: DISPENSED
Start: 2023-01-17

## (undated) RX ORDER — FENTANYL CITRATE 50 UG/ML
INJECTION, SOLUTION INTRAMUSCULAR; INTRAVENOUS
Status: DISPENSED
Start: 2023-01-16

## (undated) RX ORDER — ACETAMINOPHEN 325 MG/1
TABLET ORAL
Status: DISPENSED
Start: 2023-01-16